# Patient Record
Sex: FEMALE | Race: WHITE | Employment: PART TIME | URBAN - METROPOLITAN AREA
[De-identification: names, ages, dates, MRNs, and addresses within clinical notes are randomized per-mention and may not be internally consistent; named-entity substitution may affect disease eponyms.]

---

## 2018-10-03 ENCOUNTER — INITIAL CONSULT (OUTPATIENT)
Dept: ONCOLOGY | Age: 63
End: 2018-10-03
Payer: COMMERCIAL

## 2018-10-03 ENCOUNTER — TELEPHONE (OUTPATIENT)
Dept: ONCOLOGY | Age: 63
End: 2018-10-03

## 2018-10-03 VITALS
BODY MASS INDEX: 23.67 KG/M2 | HEIGHT: 67 IN | HEART RATE: 89 BPM | DIASTOLIC BLOOD PRESSURE: 95 MMHG | SYSTOLIC BLOOD PRESSURE: 144 MMHG | TEMPERATURE: 99.1 F | WEIGHT: 150.8 LBS

## 2018-10-03 DIAGNOSIS — S20.112D: Primary | ICD-10-CM

## 2018-10-03 PROCEDURE — 99201 HC NEW PT, E/M LEVEL 1: CPT | Performed by: INTERNAL MEDICINE

## 2018-10-03 PROCEDURE — 99215 OFFICE O/P EST HI 40 MIN: CPT | Performed by: INTERNAL MEDICINE

## 2018-10-03 RX ORDER — HYDROXYZINE HYDROCHLORIDE 25 MG/1
TABLET, FILM COATED ORAL
COMMUNITY

## 2018-10-03 RX ORDER — ACETAMINOPHEN 325 MG/1
TABLET ORAL
COMMUNITY

## 2018-10-03 RX ORDER — LISINOPRIL 10 MG/1
TABLET ORAL
COMMUNITY
End: 2022-09-23

## 2018-10-03 RX ORDER — ZOLPIDEM TARTRATE 10 MG/1
TABLET ORAL
COMMUNITY

## 2018-10-03 RX ORDER — AMITRIPTYLINE HYDROCHLORIDE 25 MG/1
TABLET, FILM COATED ORAL
COMMUNITY
End: 2022-09-23

## 2018-10-03 RX ORDER — PROMETHAZINE HYDROCHLORIDE 25 MG/1
TABLET ORAL
COMMUNITY
End: 2022-09-23

## 2018-10-03 RX ORDER — GLUCOSAMINE/CHONDR SU A SOD 750-600 MG
TABLET ORAL
COMMUNITY

## 2018-10-03 RX ORDER — TAMOXIFEN CITRATE 10 MG/1
TABLET ORAL
COMMUNITY
Start: 2018-07-26 | End: 2019-01-09 | Stop reason: ALTCHOICE

## 2018-10-03 RX ORDER — TRAMADOL HYDROCHLORIDE 50 MG/1
TABLET ORAL
COMMUNITY

## 2018-10-03 RX ORDER — ALPRAZOLAM 0.25 MG/1
TABLET ORAL
COMMUNITY
Start: 2018-07-26

## 2018-10-03 NOTE — PROGRESS NOTES
approximate 12:00 area of the left breast.  This was found by mammography. She underwent a biopsy which by description was positive for invasive ductal carcinoma ER/OK positive HER-2 nonamplified. She subtotally underwent a left lumpectomy in January 2011. Pathology by description identified a 2 mm tumor that represented a T1 attila, PA and 0 with 4 sentinel lymph nodes identified M0 tumor. 90% ER positive. She underwent radiation therapy followed by tamoxifen. He is been on tamoxifen now for proximally 7 years. Describes frequent hot flashes 3-5 times a day often waking her up at night although she attributes her sleep status also to her chronic insomnia. Notes myalgias and arthralgias diffusely risks hands hips knees as a part of either arthritis \"or maybe the tamoxifen causing this to I question that. .. \". She was menopausal till age 61. He describes known conversations with Dr. Carmon Cranker as well as Dr. Boone Martinez in regard to known risks of tamoxifen this ethically she notes \"there was stroke, blood clot, as well the chance that I could get uterine cancer. Almer Forte Almer Forte Almer Forte \"  She describes conversations in regard to switching the tamoxifen to an aromatase inhibitor but that the choice was made to continue with the tamoxifen. She's not had a bone densitometry. Due to report high breast density she is been alternating every 6 months with mammography and breast MRI. He describes a recent conversation with Dr. Carmon Cranker in regard to [facility]" of the MRI contrast I presume that gadolinium and concerns about stopping the MRI which she describes that she would like to continue. She notes that she's been feeling well as of recent no fevers no chills or night sweats no new lumps bumps or ongoing bone pain are described. Energy appetite since well-being are excellent. She does note that she undergoes urine biopsies every 2 years.   She's not been seen by a medical oncologist in the past does cite being seen by Dr. Chantell Mehta

## 2018-10-03 NOTE — TELEPHONE ENCOUNTER
AVS from 10/3/2018     put orders in for labs in nov and dec 2018. He states he only wants the dec set of labs.    RV jan 9, 2019 @ 1pm    Pt was given AVS and appt schedule

## 2018-12-19 ENCOUNTER — HOSPITAL ENCOUNTER (OUTPATIENT)
Age: 63
Discharge: HOME | End: 2018-12-19
Payer: COMMERCIAL

## 2018-12-19 DIAGNOSIS — S20.112D: ICD-10-CM

## 2018-12-19 LAB
ABSOLUTE EOS #: 0.2 K/UL (ref 0–0.4)
ABSOLUTE IMMATURE GRANULOCYTE: ABNORMAL K/UL (ref 0–0.3)
ABSOLUTE LYMPH #: 2.2 K/UL (ref 1–4.8)
ABSOLUTE MONO #: 0.5 K/UL (ref 0.1–1.2)
ALBUMIN SERPL-MCNC: 4.1 G/DL (ref 3.5–5.2)
ALBUMIN/GLOBULIN RATIO: 1.5 (ref 1–2.5)
ALP BLD-CCNC: 81 U/L (ref 35–104)
ALT SERPL-CCNC: 22 U/L (ref 5–33)
ANION GAP SERPL CALCULATED.3IONS-SCNC: 16 MMOL/L (ref 9–17)
AST SERPL-CCNC: 28 U/L
BASOPHILS # BLD: 2 % (ref 0–2)
BASOPHILS ABSOLUTE: 0.1 K/UL (ref 0–0.2)
BILIRUB SERPL-MCNC: 0.32 MG/DL (ref 0.3–1.2)
BUN BLDV-MCNC: 14 MG/DL (ref 8–23)
BUN/CREAT BLD: ABNORMAL (ref 9–20)
CALCIUM SERPL-MCNC: 9.3 MG/DL (ref 8.6–10.4)
CHLORIDE BLD-SCNC: 104 MMOL/L (ref 98–107)
CO2: 23 MMOL/L (ref 20–31)
CREAT SERPL-MCNC: 0.61 MG/DL (ref 0.5–0.9)
DIFFERENTIAL TYPE: ABNORMAL
EOSINOPHILS RELATIVE PERCENT: 4 % (ref 1–4)
GFR AFRICAN AMERICAN: >60 ML/MIN
GFR NON-AFRICAN AMERICAN: >60 ML/MIN
GFR SERPL CREATININE-BSD FRML MDRD: ABNORMAL ML/MIN/{1.73_M2}
GFR SERPL CREATININE-BSD FRML MDRD: ABNORMAL ML/MIN/{1.73_M2}
GLUCOSE BLD-MCNC: 104 MG/DL (ref 70–99)
HCT VFR BLD CALC: 40.7 % (ref 36–46)
HEMOGLOBIN: 14.1 G/DL (ref 12–16)
IMMATURE GRANULOCYTES: ABNORMAL %
LYMPHOCYTES # BLD: 32 % (ref 24–44)
MCH RBC QN AUTO: 33.3 PG (ref 26–34)
MCHC RBC AUTO-ENTMCNC: 34.5 G/DL (ref 31–37)
MCV RBC AUTO: 96.3 FL (ref 80–100)
MONOCYTES # BLD: 7 % (ref 2–11)
NRBC AUTOMATED: ABNORMAL PER 100 WBC
PDW BLD-RTO: 12.3 % (ref 12.5–15.4)
PLATELET # BLD: 300 K/UL (ref 140–450)
PLATELET ESTIMATE: ABNORMAL
PMV BLD AUTO: 7.5 FL (ref 6–12)
POTASSIUM SERPL-SCNC: 4.2 MMOL/L (ref 3.7–5.3)
RBC # BLD: 4.23 M/UL (ref 4–5.2)
RBC # BLD: ABNORMAL 10*6/UL
SEG NEUTROPHILS: 55 % (ref 36–66)
SEGMENTED NEUTROPHILS ABSOLUTE COUNT: 3.7 K/UL (ref 1.8–7.7)
SODIUM BLD-SCNC: 143 MMOL/L (ref 135–144)
TOTAL PROTEIN: 6.9 G/DL (ref 6.4–8.3)
WBC # BLD: 6.7 K/UL (ref 3.5–11)
WBC # BLD: ABNORMAL 10*3/UL

## 2018-12-19 PROCEDURE — 36415 COLL VENOUS BLD VENIPUNCTURE: CPT

## 2018-12-19 PROCEDURE — 85025 COMPLETE CBC W/AUTO DIFF WBC: CPT

## 2018-12-19 PROCEDURE — 80053 COMPREHEN METABOLIC PANEL: CPT

## 2019-01-09 ENCOUNTER — OFFICE VISIT (OUTPATIENT)
Dept: ONCOLOGY | Age: 64
End: 2019-01-09
Payer: COMMERCIAL

## 2019-01-09 ENCOUNTER — TELEPHONE (OUTPATIENT)
Dept: ONCOLOGY | Age: 64
End: 2019-01-09

## 2019-01-09 VITALS
BODY MASS INDEX: 23.72 KG/M2 | SYSTOLIC BLOOD PRESSURE: 146 MMHG | TEMPERATURE: 98.2 F | DIASTOLIC BLOOD PRESSURE: 87 MMHG | HEART RATE: 79 BPM | WEIGHT: 151.44 LBS

## 2019-01-09 DIAGNOSIS — C50.412 MALIGNANT NEOPLASM OF UPPER-OUTER QUADRANT OF LEFT BREAST IN FEMALE, ESTROGEN RECEPTOR POSITIVE (HCC): Primary | ICD-10-CM

## 2019-01-09 DIAGNOSIS — Z17.0 MALIGNANT NEOPLASM OF UPPER-OUTER QUADRANT OF LEFT BREAST IN FEMALE, ESTROGEN RECEPTOR POSITIVE (HCC): Primary | ICD-10-CM

## 2019-01-09 PROCEDURE — 99213 OFFICE O/P EST LOW 20 MIN: CPT | Performed by: INTERNAL MEDICINE

## 2019-01-10 DIAGNOSIS — C50.412 MALIGNANT NEOPLASM OF UPPER-OUTER QUADRANT OF LEFT BREAST IN FEMALE, ESTROGEN RECEPTOR POSITIVE (HCC): Primary | ICD-10-CM

## 2019-01-10 DIAGNOSIS — Z17.0 MALIGNANT NEOPLASM OF UPPER-OUTER QUADRANT OF LEFT BREAST IN FEMALE, ESTROGEN RECEPTOR POSITIVE (HCC): Primary | ICD-10-CM

## 2019-06-19 ENCOUNTER — OFFICE VISIT (OUTPATIENT)
Dept: ONCOLOGY | Age: 64
End: 2019-06-19
Payer: COMMERCIAL

## 2019-06-19 ENCOUNTER — TELEPHONE (OUTPATIENT)
Dept: ONCOLOGY | Age: 64
End: 2019-06-19

## 2019-06-19 ENCOUNTER — HOSPITAL ENCOUNTER (OUTPATIENT)
Age: 64
Discharge: HOME | End: 2019-06-19
Payer: COMMERCIAL

## 2019-06-19 VITALS
DIASTOLIC BLOOD PRESSURE: 91 MMHG | BODY MASS INDEX: 23.46 KG/M2 | HEART RATE: 72 BPM | WEIGHT: 149.8 LBS | TEMPERATURE: 98.5 F | SYSTOLIC BLOOD PRESSURE: 155 MMHG

## 2019-06-19 DIAGNOSIS — C50.919 MALIGNANT NEOPLASM OF FEMALE BREAST, UNSPECIFIED ESTROGEN RECEPTOR STATUS, UNSPECIFIED LATERALITY, UNSPECIFIED SITE OF BREAST (HCC): ICD-10-CM

## 2019-06-19 PROCEDURE — 99213 OFFICE O/P EST LOW 20 MIN: CPT | Performed by: INTERNAL MEDICINE

## 2019-06-19 PROCEDURE — 99211 OFF/OP EST MAY X REQ PHY/QHP: CPT | Performed by: INTERNAL MEDICINE

## 2019-06-19 NOTE — TELEPHONE ENCOUNTER
Per Dr Andres Kraft AVS-pt to return to see him in June 2020-mamm to be done before-order given to pt per her request, she will get done at [facility] breast [facility]. Pt informed that [facility] will call her for rv in June 2020-pt understood.   Closer to return date, need to check w/Dr Andres Kraft to see if pt needs MRI (per pt)

## 2019-06-19 NOTE — PROGRESS NOTES
PROGRESS NOTE    PCP: Christiano Cantor MD  Referring Provider: No ref. provider found    VISIT DIAGNOSIS:  The encounter diagnosis was Malignant neoplasm of female breast, unspecified estrogen receptor status, unspecified laterality, unspecified site of breast ([facility] [address] PAST MEDICAL HISTORY:      Diagnosis Date    Anxiety     Cancer [facility])     left breast    Hypertension     Osteoarthritis        PAST SURGICAL HISTORY:      Procedure Laterality Date    BREAST LUMPECTOMY Left 2011    CYSTOSCOPY  2013    TONSILLECTOMY  1959       CURRENT MEDICATIONS:   Current Outpatient Medications   Medication Sig Dispense Refill    Probiotic Product (PROBIOTIC-10 PO) Take by mouth      metoprolol tartrate (LOPRESSOR) 25 MG tablet Take 25 mg by mouth 2 times daily       amitriptyline (ELAVIL) 25 MG tablet amitriptyline 25 mg tablet      promethazine (PHENERGAN) 25 MG tablet promethazine 25 mg tablet/ nightly      ALPRAZolam (XANAX) 0.25 MG tablet       traMADol (ULTRAM) 50 MG tablet / prn      zolpidem (AMBIEN) 10 MG tablet zolpidem 10 mg tablet      hydrOXYzine (ATARAX) 25 MG tablet hydroxyzine HCl 25 mg tablet/ using PRN      lisinopril (PRINIVIL;ZESTRIL) 10 MG tablet lisinopril 10 mg tablet   Take 1 tablet every day by oral route.  Lutein-Zeaxanthin 25-5 MG CAPS lutein 20 mg capsule      acetaminophen (TYLENOL) 325 MG tablet Tylenol   1 po qd PRN      Cholecalciferol (VITAMIN D3) 400 UNIT/ML LIQD Take by mouth       No current facility-administered medications for this visit. 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 female who is evaluation follow-up with a history of breast cancer. In interim since last seen she did sprain her ankle several weeks ago. She did see a family member who is an orthopedic surgeon and they did not feel she needed any intervention. It slowly gotten better it .   She denies other new lump on his bone pain no fevers no chills no night with radiation and some contraction of the breast.  There is no masses identified in the bilateral breasts. No new skin changes are noted. She had an myself of note. Will discharge either nipple bilateral axilla are without masses. There is no new changes. No pathologic changes. Lymphadenopathy:     She has no cervical adenopathy. Neurological: She is alert and oriented to person, place, and time. She displays normal reflexes. No cranial nerve deficit or sensory deficit. Coordination normal.   Skin: Skin is warm and dry. No rash noted. She is not diaphoretic. No erythema. No pallor.        LABS:   Results for orders placed or performed during the hospital encounter of 12/19/18   CBC With Auto Differential   Result Value Ref Range    WBC 6.7 3.5 - 11.0 k/uL    RBC 4.23 4.0 - 5.2 m/uL    Hemoglobin 14.1 12.0 - 16.0 g/dL    Hematocrit 40.7 36 - 46 %    MCV 96.3 80 - 100 fL    MCH 33.3 26 - 34 pg    MCHC 34.5 31 - 37 g/dL    RDW 12.3 (L) 12.5 - 15.4 %    Platelets 120 973 - 657 k/uL    MPV 7.5 6.0 - 12.0 fL    NRBC Automated NOT REPORTED per 100 WBC    Differential Type NOT REPORTED     Seg Neutrophils 55 36 - 66 %    Lymphocytes 32 24 - 44 %    Monocytes 7 2 - 11 %    Eosinophils % 4 1 - 4 %    Basophils 2 0 - 2 %    Immature Granulocytes NOT REPORTED 0 %    Segs Absolute 3.70 1.8 - 7.7 k/uL    Absolute Lymph # 2.20 1.0 - 4.8 k/uL    Absolute Mono # 0.50 0.1 - 1.2 k/uL    Absolute Eos # 0.20 0.0 - 0.4 k/uL    Basophils # 0.10 0.0 - 0.2 k/uL    Absolute Immature Granulocyte NOT REPORTED 0.00 - 0.30 k/uL    WBC Morphology NOT REPORTED     RBC Morphology NOT REPORTED     Platelet Estimate NOT REPORTED    Comprehensive Metabolic Panel   Result Value Ref Range    Glucose 104 (H) 70 - 99 mg/dL    BUN 14 8 - 23 mg/dL    CREATININE 0.61 0.50 - 0.90 mg/dL    Bun/Cre Ratio NOT REPORTED 9 - 20    Calcium 9.3 8.6 - 10.4 mg/dL    Sodium 143 135 - 144 mmol/L    Potassium 4.2 3.7 - 5.3 mmol/L    Chloride 104 98 - 107 mmol/L CO2 23 20 - 31 mmol/L    Anion Gap 16 9 - 17 mmol/L    Alkaline Phosphatase 81 35 - 104 U/L    ALT 22 5 - 33 U/L    AST 28 <32 U/L    Total Bilirubin 0.32 0.3 - 1.2 mg/dL    Total Protein 6.9 6.4 - 8.3 g/dL    Alb 4.1 3.5 - 5.2 g/dL    Albumin/Globulin Ratio 1.5 1.0 - 2.5    GFR Non-African American >60 >60 mL/min    GFR African American >60 >60 mL/min    GFR Comment          GFR Staging NOT REPORTED        IMPRESSION:     1. Malignant neoplasm of female breast, unspecified estrogen receptor status, unspecified laterality, unspecified site of breast ([facility] [address]        There is no problem list on file for this patient. PLAN:     1. This is 69-year-old white female with known limited medical problems include chronic insomnia, interstitial cystitis, question of PCO S, anxiety  She is otherwise healthy. In 2011 she under went a mammogram revealing abnormality. Biopsy resulting invasive ductal carcinoma. Subsequently underwent a left lumpectomy. .  Tumor was ER positive HER-2 nonamplified 2 mm tumor. Stage I pT1 attila, PNI oh M0. .  She underwent adjuvant radiation therapy by subsequent tamoxifen given that she was premenopausal.  Prescription and she was menopausal at age 61. Not any periods since. Had an extensive discussion of the last visit  Of continuing tamoxifen up to the 10 year and it of study as versus switching her  To aromatase therapy to complete 10 years as well versus stopping. Did to stop and hormonal therapy with previous oncologist.  I did rediscuss benefits and consideration despite a lengthy hiatus and ultimately she is adamantly against it as she relates again and given that she is now 8 to 9 years out the benefits of N-terminal therapy would be I think unknown/marginal adamantly against as well. More of the breast is performed did not reveal any concerning/pathologic findings. We ordered mammogram for 6 months and repeat MRI as well as CBC CMP prior to 1 year follow-up.   He is encouraged to contact me in the interim new questions problems or ongoing issues. She is in agreement with the plan of care.       2.       Electronically signed by Pema Thomas DO on 6/19/2019 at 1:57 PM

## 2020-06-01 ENCOUNTER — HOSPITAL ENCOUNTER (OUTPATIENT)
Age: 65
Discharge: HOME | End: 2020-06-01
Payer: COMMERCIAL

## 2020-06-01 LAB
ABSOLUTE EOS #: 0.17 K/UL (ref 0–0.44)
ABSOLUTE EOS #: 0.17 K/UL (ref 0–0.44)
ABSOLUTE IMMATURE GRANULOCYTE: <0.03 K/UL (ref 0–0.3)
ABSOLUTE IMMATURE GRANULOCYTE: <0.03 K/UL (ref 0–0.3)
ABSOLUTE LYMPH #: 2.15 K/UL (ref 1.1–3.7)
ABSOLUTE LYMPH #: 2.15 K/UL (ref 1.1–3.7)
ABSOLUTE MONO #: 0.46 K/UL (ref 0.1–1.2)
ABSOLUTE MONO #: 0.46 K/UL (ref 0.1–1.2)
ALBUMIN SERPL-MCNC: 4 G/DL (ref 3.5–5.2)
ALBUMIN SERPL-MCNC: 4 G/DL (ref 3.5–5.2)
ALBUMIN/GLOBULIN RATIO: 1.4 (ref 1–2.5)
ALBUMIN/GLOBULIN RATIO: 1.4 (ref 1–2.5)
ALP BLD-CCNC: 74 U/L (ref 35–104)
ALP BLD-CCNC: 74 U/L (ref 35–104)
ALT SERPL-CCNC: 13 U/L (ref 5–33)
ALT SERPL-CCNC: 13 U/L (ref 5–33)
ANION GAP SERPL CALCULATED.3IONS-SCNC: 11 MMOL/L (ref 9–17)
ANION GAP SERPL CALCULATED.3IONS-SCNC: 11 MMOL/L (ref 9–17)
AST SERPL-CCNC: 19 U/L
AST SERPL-CCNC: 19 U/L
BASOPHILS # BLD: 1 % (ref 0–2)
BASOPHILS # BLD: 1 % (ref 0–2)
BASOPHILS ABSOLUTE: 0.07 K/UL (ref 0–0.2)
BASOPHILS ABSOLUTE: 0.07 K/UL (ref 0–0.2)
BILIRUB SERPL-MCNC: 0.4 MG/DL (ref 0.3–1.2)
BILIRUB SERPL-MCNC: 0.4 MG/DL (ref 0.3–1.2)
BUN BLDV-MCNC: 14 MG/DL (ref 8–23)
BUN BLDV-MCNC: 14 MG/DL (ref 8–23)
BUN/CREAT BLD: NORMAL (ref 9–20)
BUN/CREAT BLD: NORMAL (ref 9–20)
CALCIUM SERPL-MCNC: 9.3 MG/DL (ref 8.6–10.4)
CALCIUM SERPL-MCNC: 9.3 MG/DL (ref 8.6–10.4)
CHLORIDE BLD-SCNC: 100 MMOL/L (ref 98–107)
CHLORIDE BLD-SCNC: 100 MMOL/L (ref 98–107)
CHOLESTEROL/HDL RATIO: 3.9
CHOLESTEROL: 228 MG/DL
CO2: 24 MMOL/L (ref 20–31)
CO2: 24 MMOL/L (ref 20–31)
CREAT SERPL-MCNC: 0.62 MG/DL (ref 0.5–0.9)
CREAT SERPL-MCNC: 0.62 MG/DL (ref 0.5–0.9)
DIFFERENTIAL TYPE: NORMAL
DIFFERENTIAL TYPE: NORMAL
EOSINOPHILS RELATIVE PERCENT: 3 % (ref 1–4)
EOSINOPHILS RELATIVE PERCENT: 3 % (ref 1–4)
GFR AFRICAN AMERICAN: >60 ML/MIN
GFR AFRICAN AMERICAN: >60 ML/MIN
GFR NON-AFRICAN AMERICAN: >60 ML/MIN
GFR NON-AFRICAN AMERICAN: >60 ML/MIN
GFR SERPL CREATININE-BSD FRML MDRD: NORMAL ML/MIN/{1.73_M2}
GLUCOSE BLD-MCNC: 81 MG/DL (ref 70–99)
GLUCOSE BLD-MCNC: 81 MG/DL (ref 70–99)
HCT VFR BLD CALC: 44.4 % (ref 36.3–47.1)
HCT VFR BLD CALC: 44.4 % (ref 36.3–47.1)
HDLC SERPL-MCNC: 58 MG/DL
HEMOGLOBIN: 14.4 G/DL (ref 11.9–15.1)
HEMOGLOBIN: 14.4 G/DL (ref 11.9–15.1)
IMMATURE GRANULOCYTES: 0 %
IMMATURE GRANULOCYTES: 0 %
LDL CHOLESTEROL: 145 MG/DL (ref 0–130)
LYMPHOCYTES # BLD: 40 % (ref 24–43)
LYMPHOCYTES # BLD: 40 % (ref 24–43)
MCH RBC QN AUTO: 32.6 PG (ref 25.2–33.5)
MCH RBC QN AUTO: 32.6 PG (ref 25.2–33.5)
MCHC RBC AUTO-ENTMCNC: 32.4 G/DL (ref 28.4–34.8)
MCHC RBC AUTO-ENTMCNC: 32.4 G/DL (ref 28.4–34.8)
MCV RBC AUTO: 100.5 FL (ref 82.6–102.9)
MCV RBC AUTO: 100.5 FL (ref 82.6–102.9)
MONOCYTES # BLD: 9 % (ref 3–12)
MONOCYTES # BLD: 9 % (ref 3–12)
NRBC AUTOMATED: 0 PER 100 WBC
NRBC AUTOMATED: 0 PER 100 WBC
PDW BLD-RTO: 11.9 % (ref 11.8–14.4)
PDW BLD-RTO: 11.9 % (ref 11.8–14.4)
PLATELET # BLD: 249 K/UL (ref 138–453)
PLATELET # BLD: 249 K/UL (ref 138–453)
PLATELET ESTIMATE: NORMAL
PLATELET ESTIMATE: NORMAL
PMV BLD AUTO: 9.9 FL (ref 8.1–13.5)
PMV BLD AUTO: 9.9 FL (ref 8.1–13.5)
POTASSIUM SERPL-SCNC: 4.4 MMOL/L (ref 3.7–5.3)
POTASSIUM SERPL-SCNC: 4.4 MMOL/L (ref 3.7–5.3)
RBC # BLD: 4.42 M/UL (ref 3.95–5.11)
RBC # BLD: 4.42 M/UL (ref 3.95–5.11)
RBC # BLD: NORMAL 10*6/UL
RBC # BLD: NORMAL 10*6/UL
SEG NEUTROPHILS: 47 % (ref 36–65)
SEG NEUTROPHILS: 47 % (ref 36–65)
SEGMENTED NEUTROPHILS ABSOLUTE COUNT: 2.48 K/UL (ref 1.5–8.1)
SEGMENTED NEUTROPHILS ABSOLUTE COUNT: 2.48 K/UL (ref 1.5–8.1)
SODIUM BLD-SCNC: 135 MMOL/L (ref 135–144)
SODIUM BLD-SCNC: 135 MMOL/L (ref 135–144)
TOTAL PROTEIN: 6.9 G/DL (ref 6.4–8.3)
TOTAL PROTEIN: 6.9 G/DL (ref 6.4–8.3)
TRIGL SERPL-MCNC: 125 MG/DL
VLDLC SERPL CALC-MCNC: ABNORMAL MG/DL (ref 1–30)
WBC # BLD: 5.3 K/UL (ref 3.5–11.3)
WBC # BLD: 5.3 K/UL (ref 3.5–11.3)
WBC # BLD: NORMAL 10*3/UL
WBC # BLD: NORMAL 10*3/UL

## 2020-06-01 PROCEDURE — 85025 COMPLETE CBC W/AUTO DIFF WBC: CPT

## 2020-06-01 PROCEDURE — 80053 COMPREHEN METABOLIC PANEL: CPT

## 2020-06-01 PROCEDURE — 80061 LIPID PANEL: CPT

## 2022-04-03 ENCOUNTER — HOSPITAL ENCOUNTER (EMERGENCY)
Age: 67
Discharge: HOME | End: 2022-04-04
Attending: EMERGENCY MEDICINE
Payer: MEDICARE

## 2022-04-03 DIAGNOSIS — I10 ESSENTIAL HYPERTENSION: ICD-10-CM

## 2022-04-03 DIAGNOSIS — R51.9 ACUTE NONINTRACTABLE HEADACHE, UNSPECIFIED HEADACHE TYPE: Primary | ICD-10-CM

## 2022-04-03 LAB
ABSOLUTE EOS #: 0 K/UL (ref 0–0.4)
ABSOLUTE LYMPH #: 2.3 K/UL (ref 1–4.8)
ABSOLUTE MONO #: 1 K/UL (ref 0.1–1.2)
ALBUMIN SERPL-MCNC: 4.4 G/DL (ref 3.5–5.2)
ALBUMIN/GLOBULIN RATIO: 1.6 (ref 1–2.5)
ALP BLD-CCNC: 73 U/L (ref 35–104)
ALT SERPL-CCNC: 30 U/L (ref 5–33)
ANION GAP SERPL CALCULATED.3IONS-SCNC: 13 MMOL/L (ref 9–17)
AST SERPL-CCNC: 23 U/L
BASOPHILS # BLD: 0 % (ref 0–2)
BASOPHILS ABSOLUTE: 0 K/UL (ref 0–0.2)
BILIRUB SERPL-MCNC: 0.34 MG/DL (ref 0.3–1.2)
BUN BLDV-MCNC: 15 MG/DL (ref 8–23)
CALCIUM SERPL-MCNC: 9.4 MG/DL (ref 8.6–10.4)
CHLORIDE BLD-SCNC: 93 MMOL/L (ref 98–107)
CO2: 25 MMOL/L (ref 20–31)
CREAT SERPL-MCNC: 0.48 MG/DL (ref 0.5–0.9)
EOSINOPHILS RELATIVE PERCENT: 0 % (ref 1–4)
GFR AFRICAN AMERICAN: >60 ML/MIN
GFR NON-AFRICAN AMERICAN: >60 ML/MIN
GFR SERPL CREATININE-BSD FRML MDRD: ABNORMAL ML/MIN/{1.73_M2}
GLUCOSE BLD-MCNC: 97 MG/DL (ref 70–99)
HCT VFR BLD CALC: 42.4 % (ref 36–46)
HEMOGLOBIN: 14.6 G/DL (ref 12–16)
LIPASE: 91 U/L (ref 13–60)
LYMPHOCYTES # BLD: 24 % (ref 24–44)
MCH RBC QN AUTO: 32.5 PG (ref 26–34)
MCHC RBC AUTO-ENTMCNC: 34.3 G/DL (ref 31–37)
MCV RBC AUTO: 94.8 FL (ref 80–100)
MONOCYTES # BLD: 10 % (ref 2–11)
PDW BLD-RTO: 12.4 % (ref 12.5–15.4)
PLATELET # BLD: 330 K/UL (ref 140–450)
PMV BLD AUTO: 7.7 FL (ref 6–12)
POTASSIUM SERPL-SCNC: 4.1 MMOL/L (ref 3.7–5.3)
RBC # BLD: 4.48 M/UL (ref 4–5.2)
SEG NEUTROPHILS: 66 % (ref 36–66)
SEGMENTED NEUTROPHILS ABSOLUTE COUNT: 6.5 K/UL (ref 1.8–7.7)
SODIUM BLD-SCNC: 131 MMOL/L (ref 135–144)
TOTAL PROTEIN: 7.1 G/DL (ref 6.4–8.3)
WBC # BLD: 10 K/UL (ref 3.5–11)

## 2022-04-03 PROCEDURE — 80053 COMPREHEN METABOLIC PANEL: CPT

## 2022-04-03 PROCEDURE — 96375 TX/PRO/DX INJ NEW DRUG ADDON: CPT

## 2022-04-03 PROCEDURE — 6360000002 HC RX W HCPCS: Performed by: EMERGENCY MEDICINE

## 2022-04-03 PROCEDURE — 6370000000 HC RX 637 (ALT 250 FOR IP): Performed by: EMERGENCY MEDICINE

## 2022-04-03 PROCEDURE — 2580000003 HC RX 258: Performed by: NURSE PRACTITIONER

## 2022-04-03 PROCEDURE — 36415 COLL VENOUS BLD VENIPUNCTURE: CPT

## 2022-04-03 PROCEDURE — 83690 ASSAY OF LIPASE: CPT

## 2022-04-03 PROCEDURE — 99285 EMERGENCY DEPT VISIT HI MDM: CPT

## 2022-04-03 PROCEDURE — 85025 COMPLETE CBC W/AUTO DIFF WBC: CPT

## 2022-04-03 PROCEDURE — 96361 HYDRATE IV INFUSION ADD-ON: CPT

## 2022-04-03 PROCEDURE — 6370000000 HC RX 637 (ALT 250 FOR IP): Performed by: NURSE PRACTITIONER

## 2022-04-03 PROCEDURE — 96372 THER/PROPH/DIAG INJ SC/IM: CPT

## 2022-04-03 PROCEDURE — 6360000002 HC RX W HCPCS: Performed by: NURSE PRACTITIONER

## 2022-04-03 PROCEDURE — 96365 THER/PROPH/DIAG IV INF INIT: CPT

## 2022-04-03 RX ORDER — MAGNESIUM SULFATE 1 G/100ML
1000 INJECTION INTRAVENOUS ONCE
Status: COMPLETED | OUTPATIENT
Start: 2022-04-03 | End: 2022-04-03

## 2022-04-03 RX ORDER — NALBUPHINE HCL 10 MG/ML
10 AMPUL (ML) INJECTION ONCE
Status: DISCONTINUED | OUTPATIENT
Start: 2022-04-03 | End: 2022-04-03

## 2022-04-03 RX ORDER — DEXAMETHASONE SODIUM PHOSPHATE 10 MG/ML
10 INJECTION INTRAMUSCULAR; INTRAVENOUS ONCE
Status: COMPLETED | OUTPATIENT
Start: 2022-04-03 | End: 2022-04-03

## 2022-04-03 RX ORDER — ACETAMINOPHEN 500 MG
1000 TABLET ORAL ONCE
Status: COMPLETED | OUTPATIENT
Start: 2022-04-03 | End: 2022-04-03

## 2022-04-03 RX ORDER — SUMATRIPTAN 6 MG/.5ML
6 INJECTION, SOLUTION SUBCUTANEOUS ONCE
Status: COMPLETED | OUTPATIENT
Start: 2022-04-03 | End: 2022-04-03

## 2022-04-03 RX ORDER — ORPHENADRINE CITRATE 30 MG/ML
60 INJECTION INTRAMUSCULAR; INTRAVENOUS ONCE
Status: COMPLETED | OUTPATIENT
Start: 2022-04-03 | End: 2022-04-03

## 2022-04-03 RX ORDER — DIPHENHYDRAMINE HYDROCHLORIDE 50 MG/ML
25 INJECTION INTRAMUSCULAR; INTRAVENOUS ONCE
Status: COMPLETED | OUTPATIENT
Start: 2022-04-03 | End: 2022-04-03

## 2022-04-03 RX ORDER — ONDANSETRON 2 MG/ML
4 INJECTION INTRAMUSCULAR; INTRAVENOUS ONCE
Status: COMPLETED | OUTPATIENT
Start: 2022-04-03 | End: 2022-04-03

## 2022-04-03 RX ORDER — 0.9 % SODIUM CHLORIDE 0.9 %
1000 INTRAVENOUS SOLUTION INTRAVENOUS ONCE
Status: COMPLETED | OUTPATIENT
Start: 2022-04-03 | End: 2022-04-03

## 2022-04-03 RX ORDER — 0.9 % SODIUM CHLORIDE 0.9 %
1000 INTRAVENOUS SOLUTION INTRAVENOUS ONCE
Status: DISCONTINUED | OUTPATIENT
Start: 2022-04-03 | End: 2022-04-03

## 2022-04-03 RX ORDER — DROPERIDOL 2.5 MG/ML
2.5 INJECTION, SOLUTION INTRAMUSCULAR; INTRAVENOUS EVERY 6 HOURS PRN
Status: DISCONTINUED | OUTPATIENT
Start: 2022-04-03 | End: 2022-04-04 | Stop reason: HOSPADM

## 2022-04-03 RX ADMIN — MAGNESIUM SULFATE HEPTAHYDRATE 1000 MG: 1 INJECTION, SOLUTION INTRAVENOUS at 20:43

## 2022-04-03 RX ADMIN — ONDANSETRON 4 MG: 2 INJECTION INTRAMUSCULAR; INTRAVENOUS at 19:02

## 2022-04-03 RX ADMIN — SUMATRIPTAN SUCCINATE 6 MG: 6 INJECTION SUBCUTANEOUS at 20:41

## 2022-04-03 RX ADMIN — HYDROMORPHONE HYDROCHLORIDE 0.5 MG: 1 INJECTION, SOLUTION INTRAMUSCULAR; INTRAVENOUS; SUBCUTANEOUS at 23:54

## 2022-04-03 RX ADMIN — ORPHENADRINE CITRATE 60 MG: 30 INJECTION INTRAMUSCULAR; INTRAVENOUS at 20:42

## 2022-04-03 RX ADMIN — HYDROMORPHONE HYDROCHLORIDE 1 MG: 1 INJECTION, SOLUTION INTRAMUSCULAR; INTRAVENOUS; SUBCUTANEOUS at 23:25

## 2022-04-03 RX ADMIN — SODIUM CHLORIDE 1000 ML: 9 INJECTION, SOLUTION INTRAVENOUS at 19:02

## 2022-04-03 RX ADMIN — DIPHENHYDRAMINE HYDROCHLORIDE 25 MG: 50 INJECTION INTRAMUSCULAR; INTRAVENOUS at 19:08

## 2022-04-03 RX ADMIN — DEXAMETHASONE SODIUM PHOSPHATE 10 MG: 10 INJECTION INTRAMUSCULAR; INTRAVENOUS at 19:04

## 2022-04-03 RX ADMIN — DROPERIDOL 2.5 MG: 2.5 INJECTION, SOLUTION INTRAMUSCULAR; INTRAVENOUS at 22:20

## 2022-04-03 RX ADMIN — ACETAMINOPHEN 1000 MG: 500 TABLET ORAL at 19:09

## 2022-04-03 ASSESSMENT — PAIN DESCRIPTION - PROGRESSION
CLINICAL_PROGRESSION: NOT CHANGED
CLINICAL_PROGRESSION: NOT CHANGED

## 2022-04-03 ASSESSMENT — ENCOUNTER SYMPTOMS
CONSTIPATION: 0
RESPIRATORY NEGATIVE: 1
NAUSEA: 1
VOMITING: 0
ANAL BLEEDING: 0
EYES NEGATIVE: 1
ABDOMINAL PAIN: 0
RECTAL PAIN: 0
BLOOD IN STOOL: 0
ABDOMINAL DISTENTION: 0
DIARRHEA: 0

## 2022-04-03 ASSESSMENT — PAIN SCALES - GENERAL
PAINLEVEL_OUTOF10: 10
PAINLEVEL_OUTOF10: 10
PAINLEVEL_OUTOF10: 8
PAINLEVEL_OUTOF10: 10

## 2022-04-03 ASSESSMENT — PAIN - FUNCTIONAL ASSESSMENT: PAIN_FUNCTIONAL_ASSESSMENT: 0-10

## 2022-04-03 ASSESSMENT — PAIN DESCRIPTION - LOCATION: LOCATION: HEAD

## 2022-04-03 ASSESSMENT — PAIN DESCRIPTION - PAIN TYPE: TYPE: ACUTE PAIN

## 2022-04-03 ASSESSMENT — PAIN DESCRIPTION - ORIENTATION: ORIENTATION: UPPER

## 2022-04-03 NOTE — ED PROVIDER NOTES
[id number] [facility] ED  [address] [address] [facility] [address] [facility] [address]  Phone: [phone]  Fax: [phone]        ADDENDUM:      Care of this patient was assumed from Dr. Malathi Olea. The patient was seen for Hypertension and Headache (ongoing 1.5 weeks)  . The patient's initial evaluation and plan have been discussed with the prior provider who initially evaluated the patient. Nursing Notes, Past Medical Hx, Past Surgical Hx, Allergies, were all reviewed. PAST MEDICAL HISTORY    has a past medical history of Anxiety, Cancer ([facility] [address] Hypertension, and Osteoarthritis. SURGICAL HISTORY      has a past surgical history that includes Cystoscopy (2013); Tonsillectomy (1959); and Breast lumpectomy (Left, 2011). CURRENT MEDICATIONS       Discharge Medication List as of 4/3/2022 11:50 PM      CONTINUE these medications which have NOT CHANGED    Details   Probiotic Product (PROBIOTIC-10 PO) Take by mouthHistorical Med      metoprolol tartrate (LOPRESSOR) 25 MG tablet Take 25 mg by mouth 2 times daily Historical Med      amitriptyline (ELAVIL) 25 MG tablet amitriptyline 25 mg tabletHistorical Med      promethazine (PHENERGAN) 25 MG tablet promethazine 25 mg tablet/ nightlyHistorical Med      ALPRAZolam (XANAX) 0.25 MG tablet Historical Med      traMADol (ULTRAM) 50 MG tablet / prnHistorical Med      zolpidem (AMBIEN) 10 MG tablet zolpidem 10 mg tabletHistorical Med      hydrOXYzine (ATARAX) 25 MG tablet hydroxyzine HCl 25 mg tablet/ using PRNHistorical Med      lisinopril (PRINIVIL;ZESTRIL) 10 MG tablet lisinopril 10 mg tablet   Take 1 tablet every day by oral route. Historical Med      Lutein-Zeaxanthin 25-5 MG CAPS lutein 20 mg capsuleHistorical Med      acetaminophen (TYLENOL) 325 MG tablet Tylenol   1 po qd PRNHistorical Med      Cholecalciferol (VITAMIN D3) 400 UNIT/ML LIQD Take by mouthHistorical Med             ALLERGIES     is allergic to aspirin, ibuprofen, and naproxen.       Diagnostic Results LABS:   Results for orders placed or performed during the hospital encounter of 04/03/22   CBC with Auto Differential   Result Value Ref Range    WBC 10.0 3.5 - 11.0 k/uL    RBC 4.48 4.0 - 5.2 m/uL    Hemoglobin 14.6 12.0 - 16.0 g/dL    Hematocrit 42.4 36 - 46 %    MCV 94.8 80 - 100 fL    MCH 32.5 26 - 34 pg    MCHC 34.3 31 - 37 g/dL    RDW 12.4 (L) 12.5 - 15.4 %    Platelets 953 103 - 640 k/uL    MPV 7.7 6.0 - 12.0 fL    Seg Neutrophils 66 36 - 66 %    Lymphocytes 24 24 - 44 %    Monocytes 10 2 - 11 %    Eosinophils % 0 (L) 1 - 4 %    Basophils 0 0 - 2 %    Segs Absolute 6.50 1.8 - 7.7 k/uL    Absolute Lymph # 2.30 1.0 - 4.8 k/uL    Absolute Mono # 1.00 0.1 - 1.2 k/uL    Absolute Eos # 0.00 0.0 - 0.4 k/uL    Basophils Absolute 0.00 0.0 - 0.2 k/uL   Comprehensive Metabolic Panel w/ Reflex to MG   Result Value Ref Range    Glucose 97 70 - 99 mg/dL    BUN 15 8 - 23 mg/dL    CREATININE 0.48 (L) 0.50 - 0.90 mg/dL    Calcium 9.4 8.6 - 10.4 mg/dL    Sodium 131 (L) 135 - 144 mmol/L    Potassium 4.1 3.7 - 5.3 mmol/L    Chloride 93 (L) 98 - 107 mmol/L    CO2 25 20 - 31 mmol/L    Anion Gap 13 9 - 17 mmol/L    Alkaline Phosphatase 73 35 - 104 U/L    ALT 30 5 - 33 U/L    AST 23 <32 U/L    Total Bilirubin 0.34 0.3 - 1.2 mg/dL    Total Protein 7.1 6.4 - 8.3 g/dL    Albumin 4.4 3.5 - 5.2 g/dL    Albumin/Globulin Ratio 1.6 1.0 - 2.5    GFR Non-African American >60 >60 mL/min    GFR African American >60 >60 mL/min    GFR Comment         Lipase   Result Value Ref Range    Lipase 91 (H) 13 - 60 U/L       RADIOLOGY:  No orders to display       RECENT VITALS:  BP: (!) 186/96, Temp: 98.4 °F (36.9 °C), Pulse: 66, Resp: 15     ED Course     The patient was given the following medications:  Orders Placed This Encounter   Medications    0.9 % sodium chloride bolus    ondansetron (ZOFRAN) injection 4 mg    dexamethasone (DECADRON) injection 10 mg    acetaminophen (TYLENOL) tablet 1,000 mg    diphenhydrAMINE (BENADRYL) injection 25 mg  SUMAtriptan (IMITREX) injection 6 mg    magnesium sulfate 1000 mg in dextrose 5% 100 mL IVPB    orphenadrine (NORFLEX) injection 60 mg    droperidol (INAPSINE) injection 2.5 mg    HYDROmorphone (DILAUDID) injection 1 mg    HYDROmorphone (DILAUDID) injection 0.5 mg       Medical Decision Making           The patient is a 78-year-old female who presents for evaluation of a recurrent headache. The patient states that she was seen at [place] [address] twice last fall for a headache and was ultimately referred to neurology because she did not have improvement with any of the medications that she was treated with in the ER. She has also had issues with hypertension over the past year and is currently on losartan and carvedilol with better control of her blood pressure. The patient has had negative imaging of her head and negative EEG. She was told by neurology, Dr. Jeremy Betancur, that she may be having migraines but the patient does not think that these are migraines. She was told by her PCP that her headaches may be related to her blood pressure. The patient states that she has not had a headache for approximately 3 months but starting 2 weeks ago she developed a sinus infection with pain to her face and radiating into her teeth. She states that she has also developed associated headache to the top of her head with intermittent photophobia. She had a telemedicine visit with her PCP a few days ago and was started on cefdinir and prednisone. She has been taking his medications with some improvement in her sinus pressure but no improvement in her headache. She states that she did have a sinus infection before her last headache. She currently denies having any associated vision changes, photophobia, phonophobia, dizziness, lightheadedness, abdominal pain, nausea or vomiting. The patient states that she is allergic to NSAIDs and has issues with insomnia.   Today she noticed that her blood pressures were elevated at home but they have been labile. She has not yet taken her evening dose of medications. The patient is refusing a CT head today. CBC and CMP are unremarkable. Lipase is mildly elevated 91 but she does not have any abdominal tenderness on exam and denies any nausea or vomiting. At time of signout plan to reassess and see if she has had any improvement in her headache. I reevaluated the patient. Cranial nerves II through XII intact. No cerebellar signs. No pronator drift. Normal finger-to-nose. No focal sensorimotor deficit. She is able to ambulate with a steady gait. The patient reports that she had no improvement with IV fluids, Benadryl, Tylenol, Decadron and Zofran. We had a long discussion about treatment options and the patient eventually agreed to try some other medications to control her headache. She was treated with Norflex, magnesium and Imitrex without improvement. I ordered the [place] we are out of this. I subsequently gave her droperidol without relief. On repeat assessment she continues to be alert and oriented without any focal deficits. I offered to have her admitted for evaluation by neurology but the patient declined. She was subsequently treated with 1 mg Dilaudid with some improvement. She was given the option again to be admitted but declined. I offered to reimage her head but she declines. She was given a second dose of 0.5 mg Dilaudid with some improvement. Her blood pressures were labile in the ER and she took her home blood pressure medications with some improvement in her blood pressure. The patient states that she would like to be discharged home and follow-up with her neurologist outpatient. I suspect that her sinus infection is likely viral but she has already started the cefdinir and prednisone so I encouraged her to finish her course based on her PCPs recommendations. I suspect this is a trigger for her headaches and she may be having underlying migraines. She was instructed to follow-up with her neurologist tomorrow and to return to the ER for worsening symptoms or any other concern. The patient understands that at this time there is no evidence for a more malignant underlying process, but also understands that early in the process of an illness or injury, an emergency department workup can be falsely reassuring. Routine discharge counseling was given, and the patient understands that worsening, changing or persistent symptoms should prompt an immediate call or follow up with their primary physician or return to the emergency department. The importance of appropriate follow up was also discussed. I have reviewed the disposition diagnosis with the patient. I have answered their questions and given discharge instructions. They voiced understanding of these instructions and did not have any further questions or complaints. Disposition     FINAL IMPRESSION      1. Acute nonintractable headache, unspecified headache type    2. Essential hypertension          DISPOSITION/PLAN   DISPOSITION Decision To Discharge 04/03/2022 11:48:43 PM      CONDITION ON DISPOSITION:   Stable    PATIENT REFERRED TO:  Jacob Cooley MD  [address]  [phone]    Schedule an appointment as soon as possible for a visit in 1 day      Robert Jesus MD  [address]  [address] [place] [address] [place] [address] [phone]    Schedule an appointment as soon as possible for a visit in 2 days      [facility] ED  [address] [place] [address]  [phone]  Go to   If symptoms worsen      DISCHARGE MEDICATIONS:  Discharge Medication List as of 4/3/2022 11:50 PM                (Please note that portions of this note were completed with a voice recognition program.  Efforts were made to edit the dictations but occasionally words are mis-transcribed.)    Agnieszka Tafoya DO  Emergency Medicine Physician                 Agnieszka Tafoya, DO  04/04/22 0601

## 2022-04-03 NOTE — ED PROVIDER NOTES
I was present in the ED during this patient's evaluation and management by the Advance Practice Provider and was available to address any concerns about their medical management.     Paula Ballesteros MD  Attending, Emergency Department      Catherine Thornton MD  04/03/22 1259

## 2022-04-03 NOTE — ED PROVIDER NOTES
[address] [facility] ED  [address] [facility] [address]  Phone: [phone]  Fax: [phone]        Pt Name: Naty Brooks  MRN: [id number]  [place] 1955  Date of evaluation: 4/3/22    [address] [place] [address]       Chief Complaint   Patient presents with    Hypertension    Headache     ongoing 1.5 weeks       HISTORY OF PRESENT ILLNESS (Location/Symptom, Timing/Onset, Context/Setting, Quality, Duration, Modifying Factors, Severity)      Naty Brooks is a 77 y.o. female with no pertinent PMH who presents to the ED via private auto with complaints of a headache has been going on for about a week and a half. Patient was diagnosed with a sinus infection by her PCP via telemedicine on Wednesday of this last week and was started on cefdinir and prednisone. She denies any chest pain or shortness of breath. She denies any dizziness lightheadedness numbness or tingling in extremities gait changes facial asymmetry. She denies any abdominal pain or back pain. She denies any fever chills or body aches. She states has been taking her medications as prescribed. She does have history of hypertension, as well as has a history of headaches. She was following with a cardiologist as well and her neurologist, but no longer does. She denies any hematuria, hematemesis, hematochezia. She denies any dysuria or increased urinary frequency/urgency. She states that when she had a headache, she took her blood pressure at home and noticed that it was elevated, that is what brought her into the emergency department. PAST MEDICAL / SURGICAL / SOCIAL / FAMILY HISTORY     PMH:  has a past medical history of Anxiety, Cancer ([address] Hypertension, and Osteoarthritis. Surgical History:  has a past surgical history that includes Cystoscopy (2013); Tonsillectomy (1959); and Breast lumpectomy (Left, 2011). Social History:  reports that she has never smoked.  She has never used smokeless tobacco. She reports that she does not use drugs. Family History: She indicated that her mother is . She indicated that her father is . She indicated that the status of her brother is unknown.   family history includes Cancer in her father and mother; Heart Attack in her mother; Heart Disease in her brother; High Blood Pressure in her mother; High Cholesterol in her mother; Stroke in her mother. Psychiatric History: None    Allergies: Aspirin, Ibuprofen, and Naproxen    Home Medications:   Prior to Admission medications    Medication Sig Start Date End Date Taking? Authorizing Provider   Probiotic Product (PROBIOTIC-10 PO) Take by mouth    Historical Provider, MD   metoprolol tartrate (LOPRESSOR) 25 MG tablet Take 25 mg by mouth 2 times daily  18   Historical Provider, MD   amitriptyline (ELAVIL) 25 MG tablet amitriptyline 25 mg tablet    Historical Provider, MD   promethazine (PHENERGAN) 25 MG tablet promethazine 25 mg tablet/ nightly    Historical Provider, MD   ALPRAZolam Melida Heys) 0.25 MG tablet  18   Historical Provider, MD   traMADol (ULTRAM) 50 MG tablet / prn    Historical Provider, MD   zolpidem (AMBIEN) 10 MG tablet zolpidem 10 mg tablet    Historical Provider, MD   hydrOXYzine (ATARAX) 25 MG tablet hydroxyzine HCl 25 mg tablet/ using PRN    Historical Provider, MD   lisinopril (PRINIVIL;ZESTRIL) 10 MG tablet lisinopril 10 mg tablet   Take 1 tablet every day by oral route. Historical Provider, MD   Lutein-Zeaxanthin 25-5 MG CAPS lutein 20 mg capsule    Historical Provider, MD   acetaminophen (TYLENOL) 325 MG tablet Tylenol   1 po qd PRN    Historical Provider, MD   Cholecalciferol (VITAMIN D3) 400 UNIT/ML LIQD Take by mouth    Historical Provider, MD       REVIEW OF SYSTEMS  (2-9 systems for level 4, 10 ormore for level 5)      Review of Systems   Constitutional: Negative. HENT: Negative. Eyes: Negative. Respiratory: Negative. Cardiovascular: Negative.     Gastrointestinal: Positive for nausea. Negative for abdominal distention, abdominal pain, anal bleeding, blood in stool, constipation, diarrhea, rectal pain and vomiting. Intermittent nausea, no vomiting   Endocrine: Negative. Genitourinary: Negative. Musculoskeletal: Negative. Skin: Negative. Neurological: Positive for headaches. Negative for dizziness, tremors, seizures, syncope, facial asymmetry, speech difficulty, weakness, light-headedness and numbness. Hematological: Negative. Psychiatric/Behavioral: Negative. All other systems negative except as marked. PHYSICAL EXAM  (up to 7 for level 4, 8 or more for level 5)      INITIAL VITALS:  height is 5' 7\" (1.702 m) and weight is 67.1 kg (148 lb). Her oral temperature is 98.4 °F (36.9 °C). Her blood pressure is 186/96 (abnormal) and her pulse is 66. Her respiration is 15 and oxygen saturation is 93%. Vital signs reviewed. Physical Exam  Constitutional:       Appearance: Normal appearance. HENT:      Head: Normocephalic. Right Ear: Tympanic membrane, ear canal and external ear normal.      Left Ear: Tympanic membrane, ear canal and external ear normal.      Nose: Nose normal.      Mouth/Throat:      Mouth: Mucous membranes are moist.      Pharynx: Oropharynx is clear. Eyes:      Extraocular Movements: Extraocular movements intact. Conjunctiva/sclera: Conjunctivae normal.      Pupils: Pupils are equal, round, and reactive to light. Cardiovascular:      Rate and Rhythm: Normal rate and regular rhythm. Pulses: Normal pulses. Heart sounds: Normal heart sounds. Pulmonary:      Effort: Pulmonary effort is normal.      Breath sounds: Normal breath sounds. Abdominal:      General: Abdomen is flat. Bowel sounds are normal. There is no distension. Palpations: Abdomen is soft. There is no mass. Tenderness: There is no abdominal tenderness.  There is no right CVA tenderness, left CVA tenderness, guarding or rebound. Musculoskeletal:         General: Normal range of motion. Cervical back: Normal range of motion. No rigidity or tenderness. Lymphadenopathy:      Cervical: No cervical adenopathy. Skin:     General: Skin is warm and dry. Capillary Refill: Capillary refill takes less than 2 seconds. Neurological:      Mental Status: She is alert and oriented to person, place, and time. Psychiatric:         Mood and Affect: Mood normal.         Behavior: Behavior normal.         Thought Content: Thought content normal.         Judgment: Judgment normal.           DIFFERENTIAL DIAGNOSIS / MDM     Upon exam, patient resting in her room with her  at bedside. She reports the pain is about a 10/10 in her head. She states she had a CT done back last October because of her headaches, which was normal.  These headaches are not new for her, she does have a history of headaches as well as hypertension. She states she does not believe these are migraines, but has been told that they may be. Her headache is primarily behind both eyes, radiates to the top of her head. She denies any vision changes. She denies any photophobia currently. She denies any numbness or tingling in her extremities. Upper extremity strength equal bilaterally, lower extremity strength equal laterally, no facial asymmetry is noted, patient's gait is steady. Heart sounds within normal limits upon auscultation. Lung sounds are clear and equal bilaterally. Bowel sounds are present in all 4 quadrants with auscultation. No abdominal pain with palpation, no distention, no guarding, no CVA tenderness, no mass noted. We will order CBC, CMP, lipase as well as give patient an IV with some fluids. Will give patient a round of Zofran, Decadron, Tylenol and Benadryl to see if this improves her symptoms as well as improves her blood pressure.     Patient does report slight improvement of her symptoms, blood pressure is significant improved at 156/79. Patient does take lisinopril and carvedilol at home for her blood pressure. White count is within normal limits. Lipase slightly elevated at 91, but she is experiencing no abdominal pain whatsoever. Imitrex magnesium and Norflex ordered for patient to see this helps get her headache. 2135 - sign out given to Dr Kavita Allen. PLAN (LABS / IMAGING / EKG):  Orders Placed This Encounter   Procedures    CBC with Auto Differential    Comprehensive Metabolic Panel w/ Reflex to MG    Lipase    Insert peripheral IV       MEDICATIONS ORDERED:  Orders Placed This Encounter   Medications    0.9 % sodium chloride bolus    ondansetron (ZOFRAN) injection 4 mg    dexamethasone (DECADRON) injection 10 mg    acetaminophen (TYLENOL) tablet 1,000 mg    diphenhydrAMINE (BENADRYL) injection 25 mg    SUMAtriptan (IMITREX) injection 6 mg    magnesium sulfate 1000 mg in dextrose 5% 100 mL IVPB    orphenadrine (NORFLEX) injection 60 mg    DISCONTD: nalbuphine (NUBAIN) injection 10 mg    DISCONTD: insulin regular (HUMULIN R;NOVOLIN R) injection 5 Units    DISCONTD: 0.9 % sodium chloride bolus    DISCONTD: droperidol (INAPSINE) injection 2.5 mg    DISCONTD: HYDROmorphone (DILAUDID) injection 1 mg    HYDROmorphone (DILAUDID) injection 1 mg    HYDROmorphone (DILAUDID) injection 0.5 mg       Controlled Substances Monitoring:     DIAGNOSTIC RESULTS     EKG: All EKG's are interpreted by the Emergency Department Physician who either signs or Co-signs this chart in the absenceof a cardiologist.      RADIOLOGY: All images are read by the radiologist and their interpretations are reviewed. No orders to display       No results found.     LABS:  Results for orders placed or performed during the hospital encounter of 04/03/22   CBC with Auto Differential   Result Value Ref Range    WBC 10.0 3.5 - 11.0 k/uL    RBC 4.48 4.0 - 5.2 m/uL    Hemoglobin 14.6 12.0 - 16.0 g/dL    Hematocrit 42.4 36 - 46 %    MCV 94.8 80 - 100 fL    MCH 32.5 26 - 34 pg    MCHC 34.3 31 - 37 g/dL    RDW 12.4 (L) 12.5 - 15.4 %    Platelets 067 240 - 493 k/uL    MPV 7.7 6.0 - 12.0 fL    Seg Neutrophils 66 36 - 66 %    Lymphocytes 24 24 - 44 %    Monocytes 10 2 - 11 %    Eosinophils % 0 (L) 1 - 4 %    Basophils 0 0 - 2 %    Segs Absolute 6.50 1.8 - 7.7 k/uL    Absolute Lymph # 2.30 1.0 - 4.8 k/uL    Absolute Mono # 1.00 0.1 - 1.2 k/uL    Absolute Eos # 0.00 0.0 - 0.4 k/uL    Basophils Absolute 0.00 0.0 - 0.2 k/uL   Comprehensive Metabolic Panel w/ Reflex to MG   Result Value Ref Range    Glucose 97 70 - 99 mg/dL    BUN 15 8 - 23 mg/dL    CREATININE 0.48 (L) 0.50 - 0.90 mg/dL    Calcium 9.4 8.6 - 10.4 mg/dL    Sodium 131 (L) 135 - 144 mmol/L    Potassium 4.1 3.7 - 5.3 mmol/L    Chloride 93 (L) 98 - 107 mmol/L    CO2 25 20 - 31 mmol/L    Anion Gap 13 9 - 17 mmol/L    Alkaline Phosphatase 73 35 - 104 U/L    ALT 30 5 - 33 U/L    AST 23 <32 U/L    Total Bilirubin 0.34 0.3 - 1.2 mg/dL    Total Protein 7.1 6.4 - 8.3 g/dL    Albumin 4.4 3.5 - 5.2 g/dL    Albumin/Globulin Ratio 1.6 1.0 - 2.5    GFR Non-African American >60 >60 mL/min    GFR African American >60 >60 mL/min    GFR Comment         Lipase   Result Value Ref Range    Lipase 91 (H) 13 - 60 U/L       EMERGENCY DEPARTMENT COURSE           Vitals:    Vitals:    04/03/22 2344 04/03/22 2349 04/03/22 2354 04/03/22 2358   BP: (!) 211/96 (!) 196/96  (!) 186/96   Pulse:       Resp:       Temp:       TempSrc:       SpO2: 96% 96% 96% 93%   Weight:       Height:         -------------------------  BP: (!) 186/96, Temp: 98.4 °F (36.9 °C), Pulse: 66, Resp: 15      RE-EVALUATION:  See ED Course notes above. CONSULTS:  None    PROCEDURES:  None    FINAL IMPRESSION      1. Acute nonintractable headache, unspecified headache type    2. Essential hypertension          DISPOSITION / PLAN     CONDITION ON DISPOSITION:   Good / Stable for discharge.      PATIENT REFERRED TO:  Terell Ly MD  [address] [facility] [address]  [phone]    Schedule an appointment as soon as possible for a visit in 1 day      Lisbeth Portillo MD  [address] Juliette Neff [address] [place], [place] [address] [phone]    Schedule an appointment as soon as possible for a visit in 2 days      [facility] ED  [address]   [address] [address]  [phone]  Go to   If symptoms worsen      DISCHARGE MEDICATIONS:  Discharge Medication List as of 4/3/2022 11:50 PM          SANKET Minaya - NP   Emergency Medicine Nurse Practitioner    (Please note that portions of this note were completed with a voice recognition program.  Efforts were made to edit the dictations but occasionally words aremis-transcribed.)     SANKET Minaya NP  04/03/22 8028       SANKET Minaya NP  04/05/22 1113

## 2022-04-04 VITALS
WEIGHT: 148 LBS | HEART RATE: 66 BPM | BODY MASS INDEX: 23.23 KG/M2 | RESPIRATION RATE: 15 BRPM | TEMPERATURE: 98.4 F | OXYGEN SATURATION: 93 % | SYSTOLIC BLOOD PRESSURE: 186 MMHG | HEIGHT: 67 IN | DIASTOLIC BLOOD PRESSURE: 96 MMHG

## 2022-09-23 ENCOUNTER — HOSPITAL ENCOUNTER (EMERGENCY)
Age: 67
Discharge: HOME | End: 2022-09-23
Attending: EMERGENCY MEDICINE
Payer: MEDICARE

## 2022-09-23 VITALS
RESPIRATION RATE: 18 BRPM | BODY MASS INDEX: 22.13 KG/M2 | DIASTOLIC BLOOD PRESSURE: 69 MMHG | TEMPERATURE: 98.2 F | OXYGEN SATURATION: 100 % | HEART RATE: 72 BPM | WEIGHT: 141 LBS | SYSTOLIC BLOOD PRESSURE: 160 MMHG | HEIGHT: 67 IN

## 2022-09-23 DIAGNOSIS — R51.9 INTRACTABLE EPISODIC HEADACHE, UNSPECIFIED HEADACHE TYPE: Primary | ICD-10-CM

## 2022-09-23 PROCEDURE — 99281 EMR DPT VST MAYX REQ PHY/QHP: CPT

## 2022-09-23 RX ORDER — 0.9 % SODIUM CHLORIDE 0.9 %
1000 INTRAVENOUS SOLUTION INTRAVENOUS ONCE
Status: DISCONTINUED | OUTPATIENT
Start: 2022-09-23 | End: 2022-09-23 | Stop reason: HOSPADM

## 2022-09-23 RX ORDER — DEXAMETHASONE SODIUM PHOSPHATE 10 MG/ML
10 INJECTION, SOLUTION INTRAMUSCULAR; INTRAVENOUS ONCE
Status: DISCONTINUED | OUTPATIENT
Start: 2022-09-23 | End: 2022-09-23 | Stop reason: HOSPADM

## 2022-09-23 RX ORDER — CARVEDILOL 12.5 MG/1
12.5 TABLET ORAL 2 TIMES DAILY WITH MEALS
COMMUNITY

## 2022-09-23 RX ORDER — LOSARTAN POTASSIUM AND HYDROCHLOROTHIAZIDE 12.5; 5 MG/1; MG/1
1 TABLET ORAL DAILY
COMMUNITY

## 2022-09-23 RX ORDER — ONDANSETRON 2 MG/ML
4 INJECTION INTRAMUSCULAR; INTRAVENOUS ONCE
Status: DISCONTINUED | OUTPATIENT
Start: 2022-09-23 | End: 2022-09-23 | Stop reason: HOSPADM

## 2022-09-23 RX ORDER — DIPHENHYDRAMINE HYDROCHLORIDE 50 MG/ML
25 INJECTION INTRAMUSCULAR; INTRAVENOUS ONCE
Status: DISCONTINUED | OUTPATIENT
Start: 2022-09-23 | End: 2022-09-23 | Stop reason: HOSPADM

## 2022-09-23 RX ORDER — PRAVASTATIN SODIUM 40 MG
40 TABLET ORAL DAILY
COMMUNITY

## 2022-09-23 ASSESSMENT — PAIN - FUNCTIONAL ASSESSMENT: PAIN_FUNCTIONAL_ASSESSMENT: 0-10

## 2022-09-23 ASSESSMENT — PAIN DESCRIPTION - LOCATION: LOCATION: HEAD

## 2022-09-23 ASSESSMENT — PAIN SCALES - GENERAL: PAINLEVEL_OUTOF10: 10

## 2022-09-23 ASSESSMENT — PAIN DESCRIPTION - PAIN TYPE: TYPE: CHRONIC PAIN

## 2022-09-23 NOTE — ED PROVIDER NOTES
[address] [facility] ED  [address] [facility] [address]  Phone: [phone]  Fax: Radha Blandmar [phone]      Pt Name: Fatuma Aguirre  MRN: [id number]  [place] 1955  Date of evaluation: 9/23/22      CHIEF COMPLAINT:   Chief Complaint   Patient presents with    Headache     HISTORY OF PRESENT ILLNESS    Fatuma Aguirre is a 77 y.o. female who presents with a CEPHALGIA complaint:     \"Worst headache of your life? \"     NO  Typical in location/character to previous headaches? YES  Gradually worsening? YES   Nausea? YES    Vomiting? NO  Photophobia? YES  Vision changes? NO  Significant URI/sinusitis? NO  Neck pain? NO  Fevers? NO  Timeframe/context? Patient here with  for evaluation of afebrile and nontraumatic headache. Patient states is been going on for a number of days.  reports that she has been having headaches like this for over a year. Patient is managed by [facility] neurology as well as via her PCP. They do identify that she was just giving a recent pain management referral that is still forthcoming. Patient confirms that this is similar in character location to her episodic headaches, with it being described as global but a little more bilateral/frontal.  Patient states that that her neurology office has completed MR and CT imaging and they have not come up with anything as a diagnosis. Patient denies any new vision changes, fever/chills, new neck pain, any other chest-respiratory-abdominal-urinary pain or symptoms. Patient reports she does have some hypertension of that when she was here earlier in the spring she was having some blood pressure issues as well. No ENT/URI symptoms. Modifying factors? As above        Nursing Notes were reviewed. REVIEW OF SYSTEMS     Constitutional: per HPI  Eyes: per HPI   Neck: No neck pain. Respiratory: Denies recent shortness of breath.     Cardiac:  Denies recent chest pain. GI:  Per HPI  : Denies dysuria. Musculoskeletal: per HPI  Neurologic:  Per HPI  Skin:  Denies any rash. Negative in 10 essential Systems except as mentioned above and in the HPI. PAST MEDICAL HISTORY   PMH:  has a past medical history of Anxiety, Cancer ([address] Hypertension, and Osteoarthritis. Surgical History:  has a past surgical history that includes Cystoscopy (2013); Tonsillectomy (1959); and Breast lumpectomy (Left, 2011). Social History:  reports that she has never smoked. She has never used smokeless tobacco. She reports that she does not use drugs. Family History: Noncontributory   Psychiatric History: Noncontributory     Allergies:is allergic to aspirin, ibuprofen, and naproxen. PHYSICAL EXAM     INITIAL VITALS: BP (!) 160/69   Pulse 72   Temp 98.2 °F (36.8 °C) (Oral)   Resp 18   Ht 5' 7\" (1.702 m)   Wt 64 kg (141 lb)   SpO2 100%   BMI 22.08 kg/m²     Constitutional:  Well developed, alert and age appropriate interaction,  anxious, pain distress but nontoxic. Eyes:  Pupils equal and readily reactive to light at 3 mm bilaterally  HENT:  Atraumatic, external ears normal, bilat TM wnl.  nose normal, oropharynx moist.  No tenderness to palpation of the temporal arteries. Neck:  No midline pain. Full range of motion. Supple with no meningismus. Respiratory:  Clear to auscultation bilaterally with good air exchange, no W/R/R  Cardiovascular:  RRR with normal S1 and S2  Musculoskeletal:  No edema, no tenderness, no deformities, full ROM x 4 and NV intact distally  Back:  No midline tenderness or pain with range of motion. No CVA tenderness. Integument:  No rash. Neuro examination:    Answering questions appropriately. No gaze deficit. No  Moving all extremities no SILT of BUE/BLE. No visual field deficits. Extraocular movements intact. Pt can raise eyebrows and close eyes tight. Hearing intact bilaterally.   Uvula midline and no difficulty swallowing. Can rotate head side to side, active ROM w/o deficit. EMERGENCY DEPARTMENT COURSE:     Orders Placed This Encounter   Medications    DISCONTD: HYDROmorphone (DILAUDID) injection 1 mg    DISCONTD: dexamethasone (PF) (DECADRON) injection 10 mg    DISCONTD: diphenhydrAMINE (BENADRYL) injection 25 mg    DISCONTD: ondansetron (ZOFRAN) injection 4 mg    DISCONTD: 0.9 % sodium chloride bolus     1350  Nursing informs me that patient eloped after my evaluation and discussion of our treatment plan with her. Patient has a chronic history of headaches for which she is primarily seen at [facility]. Patient was nontoxic on my exam and without any deficits. I had a lengthy discussion with patient regarding our approach to chronic headaches and not providing narcotic medicines predominantly. She confirms that she sees Neurology for her headaches and has had recent head imaging as well as having a recent pain management referral.  So with all that being said that establishes the chronicity of her episodic headache history. There has been no new fevers or trauma that she reports and she confirms that this headache is similar in character and location to previous. In review of her previous visits it looks as though near all nonnarcotic medicines were provided to her without relief at her ED visit here on 4/3/22 and she did end up receiving some Dilaudid. She was discharged home with improvement of symptoms. She was here for ~ 5 hrs during that visit. With that history and our present high census today I did order her nonnarcotic meds as well as some Dilaudid. In the interim  the patient informed the nurse that she was leaving because she needed medicines more promptly and what we are going to give her \"is not going to work anyways\". She left before receiving any medicines here but I did complete by examination.        DIAGNOSTIC RESULTS     EKG: All EKG's are interpreted by the Emergency Department Physician who either signs or Co-signs this chart in the absence of a cardiologist.  Not indicated    RADIOLOGY:   Non-plain film images such as CT, Ultrasound and MRI are read by the radiologist. Plain radiographic images are visualized and preliminarily interpreted by the emergency physician with the below findings:    Not indicated    Interpretation per the Radiologist below, if available at the time of this note:    No orders to display       LABS:  Labs Reviewed - No data to display  Not indicated    All other labs were within normal range or not returned as of this dictation. FINAL IMPRESSION:     1. Intractable episodic headache, unspecified headache type          DISPOSITION:  DISPOSITION Eloped - Left Before Treatment Complete 09/23/2022 02:06:00 PM        PATIENT REFERRED TO:  No follow-up provider specified.     DISCHARGE MEDICATIONS:  Discharge Medication List as of 9/23/2022  2:10 PM          (Please note that portions of this note were completed with a voice recognition program.  Efforts were made to edit the dictations but occasionally words are mis-transcribed.)          Maxi Correia PA-C  09/24/22 2041

## 2022-09-23 NOTE — ED NOTES
Patient walks to nurses station, she informs this RN that she is leaving. She states that she has been waiting a hour and that the medications that we are going to give her will not help. Patient ambulatory with steady gait, leaving with her spouse.       Bandar Carver RN  09/23/22 6809

## 2024-08-20 ENCOUNTER — HOSPITAL ENCOUNTER (EMERGENCY)
Age: 69
Discharge: HOME | End: 2024-08-20
Attending: EMERGENCY MEDICINE
Payer: MEDICARE

## 2024-08-20 ENCOUNTER — APPOINTMENT (OUTPATIENT)
Dept: CT IMAGING | Age: 69
End: 2024-08-20
Payer: MEDICARE

## 2024-08-20 VITALS
HEIGHT: 67 IN | SYSTOLIC BLOOD PRESSURE: 181 MMHG | TEMPERATURE: 98.1 F | HEART RATE: 76 BPM | DIASTOLIC BLOOD PRESSURE: 94 MMHG | BODY MASS INDEX: 21.97 KG/M2 | WEIGHT: 140 LBS | OXYGEN SATURATION: 96 % | RESPIRATION RATE: 14 BRPM

## 2024-08-20 DIAGNOSIS — S00.01XA ABRASION OF SCALP, INITIAL ENCOUNTER: ICD-10-CM

## 2024-08-20 DIAGNOSIS — S09.90XA CLOSED HEAD INJURY, INITIAL ENCOUNTER: Primary | ICD-10-CM

## 2024-08-20 DIAGNOSIS — S00.03XA HEMATOMA OF SCALP, INITIAL ENCOUNTER: ICD-10-CM

## 2024-08-20 PROCEDURE — 6360000002 HC RX W HCPCS

## 2024-08-20 PROCEDURE — 70450 CT HEAD/BRAIN W/O DYE: CPT

## 2024-08-20 PROCEDURE — 99284 EMERGENCY DEPT VISIT MOD MDM: CPT

## 2024-08-20 PROCEDURE — 90471 IMMUNIZATION ADMIN: CPT

## 2024-08-20 PROCEDURE — 90715 TDAP VACCINE 7 YRS/> IM: CPT

## 2024-08-20 PROCEDURE — 72125 CT NECK SPINE W/O DYE: CPT

## 2024-08-20 RX ORDER — ONDANSETRON 4 MG/1
4 TABLET, FILM COATED ORAL 3 TIMES DAILY PRN
Qty: 15 TABLET | Refills: 0 | Status: SHIPPED | OUTPATIENT
Start: 2024-08-20

## 2024-08-20 RX ADMIN — TETANUS TOXOID, REDUCED DIPHTHERIA TOXOID AND ACELLULAR PERTUSSIS VACCINE, ADSORBED 0.5 ML: 5; 2.5; 8; 8; 2.5 SUSPENSION INTRAMUSCULAR at 15:40

## 2024-08-20 ASSESSMENT — PAIN DESCRIPTION - LOCATION: LOCATION: BACK

## 2024-08-20 ASSESSMENT — PAIN DESCRIPTION - ORIENTATION: ORIENTATION: LEFT

## 2024-08-20 ASSESSMENT — PAIN - FUNCTIONAL ASSESSMENT: PAIN_FUNCTIONAL_ASSESSMENT: 0-10

## 2024-08-20 ASSESSMENT — PAIN SCALES - GENERAL: PAINLEVEL_OUTOF10: 5

## 2024-08-20 NOTE — ED PROVIDER NOTES
[facility] Emergency Department  [address]  [facility] [address]  Phone: [phone]  Fax: [phone]        Pt Name: Margaret Schoenlein  MRN: [id number]  Birthdate 1955  Date of evaluation: 24    CHIEFCOMPLAINT       Chief Complaint   Patient presents with    Fall    Head Injury     Pt arrives dt co falling back on the side walk hitting back of head. Pt denies loc, pt denies any blood thinners.        HISTORY OF PRESENT ILLNESS (Location/Symptom, Timing/Onset, Context/Setting, Quality, Duration, Modifying Factors, Severity)      Margaret Schoenlein is a 68 y.o. female with no pertinent PMH who presents to the ED via private auto with closed head injury and scalp hematoma after mechanical fall prior to arrival. Patient is not on  blood thinners, no LOC, moderate amt of bleeding. Golf ball size posterior scalp hematoma. Treatments compression to scalp. No neuro deficits, alert/oriented x 3. No other injuries. No other current medical complaints.    PAST MEDICAL / SURGICAL / SOCIAL / FAMILY HISTORY     PMH:  has a past medical history of Anxiety, Cancer (HCC), Hypertension, and Osteoarthritis.  Surgical History:  has a past surgical history that includes Cystoscopy (); Tonsillectomy (); and Breast lumpectomy (Left, ).  Social History:  reports that she has never smoked. She has never used smokeless tobacco. She reports that she does not use drugs.  Family History: She indicated that her mother is . She indicated that her father is . She indicated that the status of her brother is unknown.   family history includes Cancer in her father and mother; Heart Attack in her mother; Heart Disease in her brother; High Blood Pressure in her mother; High Cholesterol in her mother; Stroke in her mother.  Psychiatric History: None    Allergies: Aspirin, Ibuprofen, and Naproxen    Home Medications:   Prior to Admission medications    Medication Sig Start Date 
providers is based on my personal performance of the HPI, PE and MDM. For Residents, Physician Assistant/ Nurse Practitioner cases/documentation I have personally evaluated this patient and have completed at least one if not all key elements of the E/M (history, physical exam, and MDM). Additional findings are as noted.    Head injury to the right occipital area, no LOC,  no focal complaints.  CT scan of the brain is unremarkable.  Distal neurovascularly intact.  Wound care instructions given to the patient.    At this time the patient is without objective evidence of an acute process requiring hospitalization or inpatient management.  The patient has remained hemodynamically stable.  No additional indication for emergency studies at this time.  I answered all questions.  Discussed discharge instructions including standard anticipatory guidance and what should prompt a return to the emergency department, including if they get worse, are not getting better, or develop any new or concerning symptoms.  I have given a specific timeframe in which to follow-up, and who to follow-up with.  The patient demonstrate understanding.  Patient is nontoxic and stable for discharge with outpatient follow-up.      (Please note that portions of this note were completed with a voice recognition program.  Efforts were made to edit the dictations but occasionally words are mis-transcribed.)    Shana Mackey DO  Attending Emergency Medicine Physician        Shana Mackey, DO  08/20/24 1516       Shana Mackey DO  08/20/24 3000

## 2024-08-20 NOTE — DISCHARGE INSTRUCTIONS
Use tylenol or motrin for headache. Apply ice and compression to scalp hematoma several times a day for the first few days.   Use zofran as needed for any Nausea or vomiting related to your head injury.

## 2024-11-13 ENCOUNTER — HOSPITAL ENCOUNTER (OUTPATIENT)
Dept: PHYSICAL THERAPY | Facility: CLINIC | Age: 69
Setting detail: THERAPIES SERIES
Discharge: HOME | End: 2024-11-13
Payer: MEDICARE

## 2024-11-13 PROCEDURE — 97161 PT EVAL LOW COMPLEX 20 MIN: CPT

## 2024-11-13 NOTE — CONSULTS
Re-ed      []  Gait Training      []  Manual Therapy      []  Ther Activities      []  Aquatics      []  Vasocompression      []  Cervical Traction      []  Other      Total Billable time 50 min             TOTAL TREATMENT TIME: 50    Time in:1010   Time Out:1100    Electronically signed by: Amber Vazquez PT        Physician Signature:________________________________Date:__________________  By signing above or cosigning this note, I have reviewed this plan of care and certify a need for medically necessary rehabilitation services.     *PLEASE SIGN ABOVE AND FAX BACK ALL PAGES*

## 2024-11-22 ENCOUNTER — HOSPITAL ENCOUNTER (OUTPATIENT)
Dept: PHYSICAL THERAPY | Facility: CLINIC | Age: 69
Setting detail: THERAPIES SERIES
Discharge: HOME | End: 2024-11-22
Payer: MEDICARE

## 2024-11-22 PROCEDURE — 97110 THERAPEUTIC EXERCISES: CPT

## 2024-11-22 PROCEDURE — 97140 MANUAL THERAPY 1/> REGIONS: CPT

## 2024-11-22 NOTE — FLOWSHEET NOTE
[] [facility]  Outpatient Rehabilitation &  Therapy  [address]  [phone]  [phone] [] [facility]  Outpatient Rehabilitation &  Therapy  [address]  P: [phone]  F: [phone] [x] [facility]  Outpatient Rehabilitation &  Therapy  [address]  P: [phone]  F: [phone] [] [facility]  Outpatient Rehabilitation &  Therapy  [address]  [phone]  [phone] [] [facility]  Outpatient Rehabilitation &  Therapy  [address]   P: [phone]  F: [phone]  [] [facility]  Outpatient Rehabilitation &  Therapy  [address]  P: [phone]  F: [phone] [] [facility]  Outpatient Rehabilitation &  Therapy  [address]  P: [phone]  F: [phone] [] [facility]  Outpatient Rehabilitation &  Therapy  [address]  P: [phone]  F: [phone] [] [facility]  Outpatient Rehabilitation &  Therapy  [address]  P: [phone]  F: [phone]  [] [facility] Outpatient Rehabilitation &  Therapy  [address]  P: [phone]  F: [phone]     Physical Therapy Daily Treatment Note    Date:  2024  Patient Name:  Margaret Schoenlein    :  1955  MRN: [id number]  Physician: Nils Bustamante MD                              Insurance: [facility] Medicare. $40.00 co-pay. AUTH AFTER EVAL: 4 visits 24-24  Medical Diagnosis: M25.552 (ICD-10-CM) - Pain in left hip                        Rehab Codes:  M25.652, R26.2  Onset date: 24                 Next 's appt.: unknown     Visit# / total visits: ; Progress note for Medicare patient due at visit 10     Cancels/No Shows: 0    Subjective:    Pain:  [x] Yes  [] No Location: left hip  Pain Rating: (0-10 scale)

## 2024-11-25 ENCOUNTER — HOSPITAL ENCOUNTER (OUTPATIENT)
Dept: PHYSICAL THERAPY | Facility: CLINIC | Age: 69
Setting detail: THERAPIES SERIES
Discharge: HOME | End: 2024-11-25
Payer: MEDICARE

## 2024-11-25 PROCEDURE — 97110 THERAPEUTIC EXERCISES: CPT

## 2024-11-25 PROCEDURE — 97140 MANUAL THERAPY 1/> REGIONS: CPT

## 2024-11-27 ENCOUNTER — HOSPITAL ENCOUNTER (OUTPATIENT)
Dept: PHYSICAL THERAPY | Facility: CLINIC | Age: 69
Setting detail: THERAPIES SERIES
Discharge: HOME | End: 2024-11-27
Payer: MEDICARE

## 2024-11-27 PROCEDURE — 97140 MANUAL THERAPY 1/> REGIONS: CPT

## 2024-11-27 PROCEDURE — 97110 THERAPEUTIC EXERCISES: CPT

## 2024-11-27 NOTE — FLOWSHEET NOTE
[] [facility]  Outpatient Rehabilitation &  Therapy  [address]  [phone]  [phone] [] [facility]  Outpatient Rehabilitation &  Therapy  [address]  P: [phone]  F: [phone] [x] [facility]  Outpatient Rehabilitation &  Therapy  [address]  P: [phone]  F: [phone] [] [facility]  Outpatient Rehabilitation &  Therapy  [address]  [phone]  [phone] [] [facility]  Outpatient Rehabilitation &  Therapy  [address]   P: [phone]  F: [phone]  [] [facility]  Outpatient Rehabilitation &  Therapy  [address]  P: [phone]  F: [phone] [] [facility]  Outpatient Rehabilitation &  Therapy  [address]  P: [phone]  F: [phone] [] [facility]  Outpatient Rehabilitation &  Therapy  [address]  P: [phone]  F: [phone] [] [facility]  Outpatient Rehabilitation &  Therapy  [address]  P: [phone]  F: [phone]  [] [facility] Outpatient Rehabilitation &  Therapy  [address]  P: [phone]  F: [phone]     Physical Therapy Daily Treatment Note    Date:  2024  Patient Name:  Margaret Schoenlein    :  1955  MRN: [id number]  Physician: Nils Bustamante MD                              Insurance: [facility] Medicare. $40.00 co-pay. AUTH AFTER EVAL: 4 visits 24-24  Medical Diagnosis: M25.552 (ICD-10-CM) - Pain in left hip                        Rehab Codes:  M25.652, R26.2  Onset date: 24                 Next 's appt.: unknown     Visit# / total visits: ; Progress note for Medicare patient due at visit 10     Cancels/No Shows: 0    Subjective:    Pain:  [x] Yes  [] No Location: left hip  Pain Rating: (0-10 scale)

## 2024-12-02 ENCOUNTER — HOSPITAL ENCOUNTER (OUTPATIENT)
Dept: PHYSICAL THERAPY | Facility: CLINIC | Age: 69
Setting detail: THERAPIES SERIES
Discharge: HOME | End: 2024-12-02
Payer: MEDICARE

## 2024-12-02 PROCEDURE — 97140 MANUAL THERAPY 1/> REGIONS: CPT

## 2024-12-02 PROCEDURE — 97110 THERAPEUTIC EXERCISES: CPT

## 2024-12-02 NOTE — PROGRESS NOTES
[] [facility]  Outpatient Rehabilitation &  Therapy  [address]  [phone]  [phone] [] [facility]  Outpatient Rehabilitation &  Therapy  [address]  P: [phone]  F: [phone] [x] [facility]  Outpatient Rehabilitation &  Therapy  [address]  P: [phone]  F: [phone] [] [facility]  Outpatient Rehabilitation &  Therapy  [address]  [phone]  [phone] [] [facility]  Outpatient Rehabilitation &  Therapy  [address]   P: [phone]  F: [phone]  [] [facility]  Outpatient Rehabilitation &  Therapy  [address]  P: [phone]  F: [phone] [] [facility]  Outpatient Rehabilitation &  Therapy  [address]  P: [phone]  F: [phone] [] [facility]  Outpatient Rehabilitation &  Therapy  [address]  P: [phone]  F: [phone] [] [facility]  Outpatient Rehabilitation &  Therapy  [address]  P: [phone]  F: [phone]  [] [facility] Outpatient Rehabilitation &  Therapy  [address]  P: [phone]  F: [phone]     Physical Therapy Daily Treatment Note/Progress Note    Date:  2024  Patient Name:  Margaret Schoenlein    :  1955  MRN: [id number]  Physician: Nils Bustamante MD                              Insurance: [facility] Medicare. $40.00 co-pay. AUTH AFTER EVAL: 4 visits 24-24.[employer] approved 5 add'l PT vs valid 24 - 25; CPT codes 02646, 76600; Auth #[id number]   Medical Diagnosis: M25.552 (ICD-10-CM) - Pain in left hip                        Rehab Codes:  M25.652, R26.2  Onset date: 24                 Next 's appt.: unknown     Visit# / total visits: 5/10; Progress note for Medicare patient due at visit

## 2024-12-04 ENCOUNTER — HOSPITAL ENCOUNTER (OUTPATIENT)
Dept: PHYSICAL THERAPY | Facility: CLINIC | Age: 69
Setting detail: THERAPIES SERIES
Discharge: HOME | End: 2024-12-04
Payer: MEDICARE

## 2024-12-04 PROCEDURE — 97110 THERAPEUTIC EXERCISES: CPT

## 2024-12-04 NOTE — FLOWSHEET NOTE
[] [facility]  Outpatient Rehabilitation &  Therapy  [address]  [phone]  [phone] [] [facility]  Outpatient Rehabilitation &  Therapy  [address]  P: [phone]  F: [phone] [x] [facility]  Outpatient Rehabilitation &  Therapy  [address]  P: [phone]  F: [phone] [] [facility]  Outpatient Rehabilitation &  Therapy  [address]  [phone]  [phone] [] [facility]  Outpatient Rehabilitation &  Therapy  [address]   P: [phone]  F: [phone]  [] [facility]  Outpatient Rehabilitation &  Therapy  [address]  P: [phone]  F: [phone] [] [facility]  Outpatient Rehabilitation &  Therapy  [address]  P: [phone]  F: [phone] [] [facility]  Outpatient Rehabilitation &  Therapy  [address]  P: [phone]  F: [phone] [] [facility]  Outpatient Rehabilitation &  Therapy  [address]  P: [phone]  F: [phone]  [] [facility] Outpatient Rehabilitation &  Therapy  [address]  P: [phone]  F: [phone]     Physical Therapy Daily Treatment Note    Date:  2024  Patient Name:  Margaret Schoenlein    :  1955  MRN: [id number]  Physician: Nils Bustamante MD                              Insurance: [facility] Medicare. $40.00 co-pay. AUTH AFTER EVAL: 4 visits 24-24.[employer] approved 5 add'l PT vs valid 24 - 25; CPT codes 19899, 98093; Auth #[id number]   Medical Diagnosis: M25.552 (ICD-10-CM) - Pain in left hip                        Rehab Codes:  M25.652, R26.2  Onset date: 24                 Next 's appt.: unknown     Visit# / total visits: 6/10; Progress note for Medicare patient due at visit 10     Cancels/No 
understanding.  [x] Demonstrates understanding.  [x] Needs review.  [x] Fall prevention packet given on 12/2/24  [] Demonstrates/verbalizes HEP/Ed previously given.     Access Code: [email or web address]  URL: [email or web address]  Date: 11/25/2024  Prepared by: Amber    Exercises  - Supine Butterfly Groin Stretch  - 1 x daily - 3 reps - 20-30 seconds hold  - Supine Figure 4 Piriformis Stretch  - 1 x daily - 3 reps - 20-30 seconds hold  - Supine Heel Slide  - 1 x daily - 1-2 sets - 10 reps  - Supine Hip Abduction  - 1 x daily - 1-2 sets - 10 reps  - Prone Hip Internal and External Rotation AROM  - 1 x daily - 1-2 sets - 10 reps  - Prone Hip External Rotation AROM  - 1 x daily - 1-2 sets - 10 reps  - Supine Gluteal Sets  - 1 x daily - 1-2 sets - 10 reps - 5 seconds hold    Plan: [x] Continue current frequency toward long and short term goals.    [x] Specific Instructions for subsequent treatments: see above      Time In:  1300         Time Out: 1353    Electronically signed by:  FAB Gentile      The supervising therapist was present in the room directing the student during the provision of care.  The supervising therapist made the skilled judgements and was responsible for assessments and treatments. The above documentation was reviewed and accepted by supervising Clinical Instructor, Amber Vazquez PT.

## 2024-12-12 ENCOUNTER — HOSPITAL ENCOUNTER (OUTPATIENT)
Dept: PHYSICAL THERAPY | Facility: CLINIC | Age: 69
Setting detail: THERAPIES SERIES
Discharge: HOME | End: 2024-12-12
Payer: MEDICARE

## 2024-12-12 PROCEDURE — 97110 THERAPEUTIC EXERCISES: CPT

## 2024-12-12 NOTE — FLOWSHEET NOTE
[] [facility]  Outpatient Rehabilitation &  Therapy  [address]  [phone]  [phone] [] [facility]  Outpatient Rehabilitation &  Therapy  [address]  P: [phone]  F: [phone] [x] [facility]  Outpatient Rehabilitation &  Therapy  [address]  P: [phone]  F: [phone] [] [facility]  Outpatient Rehabilitation &  Therapy  [address]  [phone]  [phone] [] [facility]  Outpatient Rehabilitation &  Therapy  [address]   P: [phone]  F: [phone]  [] [facility]  Outpatient Rehabilitation &  Therapy  [address]  P: [phone]  F: [phone] [] [facility]  Outpatient Rehabilitation &  Therapy  [address]  P: [phone]  F: [phone] [] [facility]  Outpatient Rehabilitation &  Therapy  [address]  P: [phone]  F: [phone] [] [facility]  Outpatient Rehabilitation &  Therapy  [address]  P: [phone]  F: [phone]  [] [facility] Outpatient Rehabilitation &  Therapy  [address]  P: [phone]  F: [phone]     Physical Therapy Daily Treatment Note    Date:  2024  Patient Name:  Margaret Schoenlein    :  1955  MRN: [id number]  Physician: Nils Bustamante MD                              Insurance: [facility] Medicare. $40.00 co-pay. AUTH AFTER EVAL: 4 visits 24-24.[employer] approved 5 add'l PT vs valid 24 - 25; CPT codes 72585, 86525; Auth #[id number]   Medical Diagnosis: M25.552 (ICD-10-CM) - Pain in left hip                        Rehab Codes:  M25.652, R26.2  Onset date: 24                 Next 's appt.: unknown     Visit# / total visits: 7/10; Progress note for Medicare patient due at visit 10     Cancels/No 
show

## 2024-12-23 ENCOUNTER — HOSPITAL ENCOUNTER (OUTPATIENT)
Dept: PHYSICAL THERAPY | Facility: CLINIC | Age: 69
Setting detail: THERAPIES SERIES
Discharge: HOME | End: 2024-12-23
Payer: MEDICARE

## 2024-12-23 PROCEDURE — 97110 THERAPEUTIC EXERCISES: CPT

## 2024-12-23 NOTE — FLOWSHEET NOTE
[] [facility]  Outpatient Rehabilitation &  Therapy  [address]  [phone]  [phone] [] [facility]  Outpatient Rehabilitation &  Therapy  [address]  P: [phone]  F: [phone] [x] [facility]  Outpatient Rehabilitation &  Therapy  [address]  P: [phone]  F: [phone] [] [facility]  Outpatient Rehabilitation &  Therapy  [address]  [phone]  [phone] [] [facility]  Outpatient Rehabilitation &  Therapy  [address]   P: [phone]  F: [phone]  [] [facility]  Outpatient Rehabilitation &  Therapy  [address]  P: [phone]  F: [phone] [] [facility]  Outpatient Rehabilitation &  Therapy  [address]  P: [phone]  F: [phone] [] [facility]  Outpatient Rehabilitation &  Therapy  [address]  P: [phone]  F: [phone] [] [facility]  Outpatient Rehabilitation &  Therapy  [address]  P: [phone]  F: [phone]  [] [facility] Outpatient Rehabilitation &  Therapy  [address]  P: [phone]  F: [phone]     Physical Therapy Daily Treatment Note    Date:  2024  Patient Name:  Margaret Schoenlein    :  1955  MRN: [id number]  Physician: Nils Bustamante MD                              Insurance: [facility] Medicare. $40.00 co-pay. AUTH AFTER EVAL: 4 visits 24-24.[employer] approved 5 add'l PT vs valid 24 - 25; CPT codes 26072, 17117; Auth #[id number]   Medical Diagnosis: M25.552 (ICD-10-CM) - Pain in left hip                        Rehab Codes:  M25.652, R26.2  Onset date: 24                 Next 's appt.: unknown     Visit# / total visits: 8/10; Progress note for Medicare patient due at visit 10     Cancels/No

## 2024-12-31 ENCOUNTER — HOSPITAL ENCOUNTER (OUTPATIENT)
Dept: PHYSICAL THERAPY | Facility: CLINIC | Age: 69
Setting detail: THERAPIES SERIES
Discharge: HOME | End: 2024-12-31
Payer: MEDICARE

## 2024-12-31 PROCEDURE — 97110 THERAPEUTIC EXERCISES: CPT

## 2024-12-31 NOTE — THERAPY DISCHARGE
[] [facility]  Outpatient Rehabilitation &  Therapy  [address]  [phone]  [phone] [] [facility]  Outpatient Rehabilitation &  Therapy  [address]  P: [phone]  F: [phone] [x] [facility]  Outpatient Rehabilitation &  Therapy  [address]  P: [phone]  F: [phone] [] [facility]  Outpatient Rehabilitation &  Therapy  [address]  [phone]  [phone] [] [facility]  Outpatient Rehabilitation &  Therapy  [address]   P: [phone]  F: [phone]  [] [facility]  Outpatient Rehabilitation &  Therapy  [address]  P: [phone]  F: [phone] [] [facility]  Outpatient Rehabilitation &  Therapy  [address]  P: [phone]  F: [phone] [] [facility]  Outpatient Rehabilitation &  Therapy  [address]  P: [phone]  F: [phone] [] [facility]  Outpatient Rehabilitation &  Therapy  [address]  P: [phone]  F: [phone]  [] [facility] Outpatient Rehabilitation &  Therapy  [address]  P: [phone]  F: [phone]     Physical Therapy Daily Treatment Note/Discharge Note    Date:  2024  Patient Name:  Margaret Schoenlein    :  1955  MRN: [id number]  Physician: Nils Bustamante MD                              Insurance: [facility] Medicare. $40.00 co-pay. AUTH AFTER EVAL: 4 visits 24-24.[employer] approved 5 add'l PT vs valid 24 - 25; CPT codes 08829, 33228; Auth #[id number]   Medical Diagnosis: M25.552 (ICD-10-CM) - Pain in left hip                        Rehab Codes:  M25.652, R26.2  Onset date: 24                 Next 's appt.: unknown     Visit# / total visits: ; Progress note for Medicare patient due at visit

## 2025-05-22 ENCOUNTER — HOSPITAL ENCOUNTER (OUTPATIENT)
Dept: PHYSICAL THERAPY | Facility: CLINIC | Age: 70
Setting detail: THERAPIES SERIES
Discharge: HOME | End: 2025-05-22
Payer: MEDICARE

## 2025-05-22 PROCEDURE — 97110 THERAPEUTIC EXERCISES: CPT

## 2025-05-22 PROCEDURE — 97161 PT EVAL LOW COMPLEX 20 MIN: CPT

## 2025-05-22 NOTE — CONSULTS
Tolerated well.     Problem list, as detailed above:   [x] ? Pain     [x] ? ROM    [x] ? Strength    [] ? Function:   [] ? Balance  [x] Edema  [] Postural Deviations  [x] Gait Deviations  [] Other     STG: (to be met in 6 treatments)  ? Pain:left hip to 2/10 at worst to improve waking tolerance  ? ROM: left hip abduction to at least 22 degrees to improve car transfers  ? Strength:left quad to 4/5 to improve walking tolerance  ? Strength left hip abductor to 4/5 to improve walking tolerance  Patient to be independent with home exercise program as demonstrated by performance with correct form without cues.  Demonstrate Knowledge of fall prevention met 5/23/25  LTG: (to be met in 12 treatments)  WOMAC 10% or less impaired  Improve left quad and hip abductor strength to 4+ to allow for reciprocal stairs                   Patient goals:\" ease of bending knee and improve gait\"    Rehab Potential:  [x] Good  [] Fair  [] Poor   Suggested Professional Referral:  [x] No  [] Yes:  Barriers to Goal Achievement:  [x] No  [] Yes:  Domestic Concerns:  [x] No  [] Yes:    Pt. Education:  [x] Plans/Goals, Risks/Benefits discussed  [x] Home exercise program    Method of Education: [x] Verbal  [x] Demo  [x] Written  Comprehension of Education:  [x] Verbalizes understanding.  [x] Demonstrates understanding.  [x] Needs Review.  [] Demonstrates/verbalizes understanding of HEP/Ed previously given.    Discussed fall prevention and provided handout.    Access Code: [email or web address]  URL: [email or web address]  Date: 05/22/2025  Prepared by: Amber    Exercises  - Supine Heel Slide  - 1 x daily - 1-2 sets - 10 reps  - Supine Hip Abduction  - 1 x daily - 1-2 sets - 10 reps - 5 seconds hold  - Supine Gluteal Sets  - 1 x daily - 1-2 sets - 10 reps - 5 seconds hold  - Sidelying TFL Stretch  - 1 x daily - 3 reps - 30 seconds hold  - Seated Long Arc Quad  - 1 x daily - 1-2 sets - 10 reps - 5 seconds hold  - Standing Hip Abduction with Counter

## 2025-05-28 ENCOUNTER — HOSPITAL ENCOUNTER (OUTPATIENT)
Dept: PHYSICAL THERAPY | Facility: CLINIC | Age: 70
Setting detail: THERAPIES SERIES
Discharge: HOME | End: 2025-05-28
Payer: MEDICARE

## 2025-05-28 PROCEDURE — 97110 THERAPEUTIC EXERCISES: CPT

## 2025-05-28 NOTE — FLOWSHEET NOTE
[] [facility]  Outpatient Rehabilitation &  Therapy  [address]  [phone]  [phone] [] [facility]  Outpatient Rehabilitation &  Therapy  [address]  P: [phone]  F: [phone] [x] [facility]  Outpatient Rehabilitation &  Therapy  [address]  P: [phone]  F: [phone] [] [facility]  Outpatient Rehabilitation &  Therapy  [address]  [phone]  [phone] [] [facility]  Outpatient Rehabilitation &  Therapy  [address]   P: [phone]  F: [phone]  [] [facility]  Outpatient Rehabilitation &  Therapy  [address]  P: [phone]  F: [phone] [] [facility]  Outpatient Rehabilitation &  Therapy  [address]  P: [phone]  F: [phone] [] [facility]  Outpatient Rehabilitation &  Therapy  [address]  P: [phone]  F: [phone] [] [facility]  Outpatient Rehabilitation &  Therapy  [address]  P: [phone]  F: [phone]  [] [facility] Outpatient Rehabilitation &  Therapy  [address]  P: [phone]  F: [phone]     Physical Therapy Daily Treatment Note    Date:  2025  Patient Name:  Margaret Schoenlein    :  1955  MRN: [id number]  Physician: Kb Hernandez APRN - CNP                         Insurance: [employer] Medicare, auth after eval, $35 copay,  approved 10vs after eval&treat, totaling 11vs from -25 auth#[id number]. CPTs 77709,97884,37725 oop 4500/3611.09 rem.      Medical Diagnosis: Z47.1 (ICD-10-CM) - Aftercare following joint replacement [id number] (ICD-10-CM) - Presence of left artificial hip joint                  Rehab Codes:M25.552, M25.652, R26.2   Onset date: 25               Next 's appt.: 25     Visit# /

## 2025-06-05 ENCOUNTER — HOSPITAL ENCOUNTER (OUTPATIENT)
Dept: PHYSICAL THERAPY | Facility: CLINIC | Age: 70
Setting detail: THERAPIES SERIES
Discharge: HOME | End: 2025-06-05
Payer: MEDICARE

## 2025-06-05 PROCEDURE — 97110 THERAPEUTIC EXERCISES: CPT

## 2025-06-11 PROCEDURE — 97110 THERAPEUTIC EXERCISES: CPT

## 2025-06-11 NOTE — FLOWSHEET NOTE
10-30 seconds hold  - Standing Tandem Balance with Counter Support  - 1 x daily - 2-3 reps - 10-20 seconds hold  - Backward Walking with Counter Support  - 1 x daily  - Seated Long Arc Quad with Ankle Weight  - 2-3 x weekly - 1-2 sets - 10 reps - 2 seconds hold  - Standing Hip Abduction with Resistance at Ankles and Counter Support  - 2-3 x weekly - 1-2 sets - 10 reps  - Side Stepping with Resistance at Ankles and Counter Support  - 2-3 x weekly    Plan: [x] Continue current frequency toward long and short term goals.    [x] Specific Instructions for subsequent treatments: see above      Time In:1300         Time Out: 1342    Electronically signed by:  Amber Vazquez PT

## 2025-06-18 ENCOUNTER — HOSPITAL ENCOUNTER (OUTPATIENT)
Dept: PHYSICAL THERAPY | Facility: CLINIC | Age: 70
Setting detail: THERAPIES SERIES
Discharge: HOME | End: 2025-06-18
Payer: MEDICARE

## 2025-06-18 PROCEDURE — 97110 THERAPEUTIC EXERCISES: CPT

## 2025-06-18 NOTE — FLOWSHEET NOTE
[] [facility]  Outpatient Rehabilitation &  Therapy  [address]  [phone]  [phone] [] [facility]  Outpatient Rehabilitation &  Therapy  [address]  P: [phone]  F: [phone] [x] [facility]  Outpatient Rehabilitation &  Therapy  [address]  P: [phone]  F: [phone] [] [facility]  Outpatient Rehabilitation &  Therapy  [address]  [phone]  [phone] [] [facility]  Outpatient Rehabilitation &  Therapy  [address]   P: [phone]  F: [phone]  [] [facility]  Outpatient Rehabilitation &  Therapy  [address]  P: [phone]  F: [phone] [] [facility]  Outpatient Rehabilitation &  Therapy  [address]  P: [phone]  F: [phone] [] [facility]  Outpatient Rehabilitation &  Therapy  [address]  P: [phone]  F: [phone] [] [facility]  Outpatient Rehabilitation &  Therapy  [address]  P: [phone]  F: [phone]  [] [facility] Outpatient Rehabilitation &  Therapy  [address]  P: [phone]  F: [phone]     Physical Therapy Daily Treatment Note    Date:  2025  Patient Name:  Margaret Schoenlein    :  1955  MRN: [id number]  Physician: Kb Hernandez APRN - CNP                         Insurance: [employer] Medicare, auth after eval, $35 copay,  approved 10vs after eval&treat, totaling 11vs from -25 auth#[id number]. CPTs 36821,84961,20598 oop 4500/3611.09 rem.      Medical Diagnosis: Z47.1 (ICD-10-CM) - Aftercare following joint replacement [id number] (ICD-10-CM) - Presence of left artificial hip joint                  Rehab Codes:M25.552, M25.652, R26.2   Onset date: 25               Next 's appt.: 25     Visit# /

## 2025-06-25 ENCOUNTER — HOSPITAL ENCOUNTER (OUTPATIENT)
Dept: PHYSICAL THERAPY | Facility: CLINIC | Age: 70
Setting detail: THERAPIES SERIES
Discharge: HOME | End: 2025-06-25
Payer: MEDICARE

## 2025-06-25 PROCEDURE — 97110 THERAPEUTIC EXERCISES: CPT

## 2025-06-25 NOTE — PROGRESS NOTES
Visit# / total visits: 6/11; Progress note due at visit 11     Cancels/No Shows: 0    Subjective:    Pain:  [x] Yes  [] No Location: left hip  Pain Rating: (0-10 scale) 1/10 (3/10 at worst)  Pain altered Tx:  [x] No  [] Yes  Action:  Comments: Patient states her incision is  to touch and her hip is still swollen.  She is ambulating without an assistive device.  She is able to descend the stairs reciprocally and most of the time ascends one at a time.  She states she continues to feel like her left LE is longer. She states her functional level is 75% of normal. She states she continues to limp with prolonged walking and states she is not walking as fast as normal.    Objective:    Gait Analysis: decreased left hip extension and decreased stance time L              ROM  ° A/P STRENGTH     Left Right Left Right   Hip Flex 90 105 3+ -   Ext           ER 15 22       IR 25 30       ABD 27 28 4 -   ADD           Knee Flex     4+ 5   Ext     4+ 5   Ankle DF     5 5                5/22/25 6/25/25   Functional Test: WOMAC Score: Raw score of 16/96,   which is 17% functionally impaired Score: Raw score of 13/96,   which is 14% functionally impaired                                Today’s Treatment:  Modalities: none  Precautions [x] No  [] Yes:    Exercises:  Exercise Reps/ Time Weight/ Level Comments   LAQ home  3#     Heel slides home       Glut sets home       Side lying IT-band stretch home   Not over bed- not > 20 degrees ext   Supine hip abduction        Supine bridge 10@5\"  lime      supine hooklying hip abd home  lime     Standing hip abduction home  orange     Standing hip extension <20 degrees -      clam  10x2       Step ups next  6\"     Lateal step ups next  6\"     Step overs next  4\"     Side stepping home  orange     Retro gait D/C    small steps to limit extension    gastroc stretch  3@30\"       Romberg standing home    eyes closed   Tandem standing home     SLS- partial with right foot on 8\" step

## 2025-07-02 ENCOUNTER — HOSPITAL ENCOUNTER (OUTPATIENT)
Dept: PHYSICAL THERAPY | Facility: CLINIC | Age: 70
Setting detail: THERAPIES SERIES
Discharge: HOME | End: 2025-07-02
Payer: MEDICARE

## 2025-07-02 PROCEDURE — 97110 THERAPEUTIC EXERCISES: CPT

## 2025-07-02 NOTE — FLOWSHEET NOTE
3 reps - 30 seconds hold  - Hooklying Clamshell with Resistance  - 2-3 x weekly - 1-2 sets - 10 reps - 5 seconds hold  - Supine Bridge with Resistance Band  - 3 x weekly - 1-2 sets - 10 reps - 5 seconds hold  - Narrow Stance with Counter Support  - 1 x daily - 3-5 reps - 10-30 seconds hold  - Standing Tandem Balance with Counter Support  - 1 x daily - 2-3 reps - 10-20 seconds hold  - Standing Hip Abduction with Resistance at Ankles and Counter Support  - 2-3 x weekly - 1-2 sets - 10 reps  - Standing Gastroc Stretch  - 1 x daily - 3 reps - 30 seconds hold  - Single Leg Stance  - 1 x daily - 3-5 reps - 5-20 seconds hold  - Sidelying Hip Abduction  - 3 x weekly - 1-2 sets - 10 reps  - Clamshell with Resistance  - 3 x weekly - 1-2 sets - 10 reps    Plan: [x] Continue current frequency toward long and short term goals.    [x] Specific Instructions for subsequent treatments: see above      Time In:1601       Time Out: 1650    Electronically signed by:  Amber Vazquez, PT

## 2025-07-09 ENCOUNTER — HOSPITAL ENCOUNTER (OUTPATIENT)
Dept: PHYSICAL THERAPY | Facility: CLINIC | Age: 70
Setting detail: THERAPIES SERIES
Discharge: HOME | End: 2025-07-09
Payer: MEDICARE

## 2025-07-09 PROCEDURE — 97110 THERAPEUTIC EXERCISES: CPT

## 2025-07-16 ENCOUNTER — HOSPITAL ENCOUNTER (OUTPATIENT)
Dept: PHYSICAL THERAPY | Facility: CLINIC | Age: 70
Setting detail: THERAPIES SERIES
Discharge: HOME | End: 2025-07-16
Payer: MEDICARE

## 2025-07-16 PROCEDURE — 97110 THERAPEUTIC EXERCISES: CPT

## 2025-07-16 NOTE — FLOWSHEET NOTE
hip valgus Added band to hip abduction at home. Tolerated well.       [] No change.     [] Other:  [x] Patient would continue to benefit from skilled physical therapy services in order to:  improve ROM, strength, and function including the ability to ascend stairs reciprocally.      Problem list:   [x] ? Pain                       [x] ? ROM                      [x] ? Strength                 [] ? Function:   [] ? Balance  [x] Edema  [] Postural Deviations  [x] Gait Deviations  [] Other               STG: (to be met in 6 treatments) reassessed 6/25/25  ? Pain:left hip to 2/10 at worst to improve waking tolerance progressing  ? ROM: left hip abduction to at least 22 degrees to improve car transfers met 6/11/25  ? Strength:left quad to 4/5 to improve walking tolerance met 6/18/25  ? Strength left hip abductor to 4/5 to improve walking tolerance met 6/25/25  Patient to be independent with home exercise program as demonstrated by performance with correct form without cues.progressing  Demonstrate Knowledge of fall prevention met 5/23/25  LTG: (to be met in 12 treatments)  WOMAC 10% or less impaired progressing  Improve left quad and hip abductor strength to 4+ to allow for reciprocal stairs met for quad, progressing for hip abduction                    Patient goals:\" ease of bending knee and improve gait\"    Pt. Education:  [x] Yes  [] No  [x] Reviewed Prior HEP/Ed  Method of Education: [x] Verbal  [x] Demo  [x] Written  Comprehension of Education:  [x] Verbalizes understanding.  [x] Demonstrates understanding.  [x] Needs review.  [] Demonstrates/verbalizes HEP/Ed previously given.    Access Code: [email or web address]  URL: [email or web address]  Date: 07/16/2025  Prepared by: Amber    Exercises  - Supine Heel Slide  - 1 x daily - 1-2 sets - 10 reps  - Supine Hip Abduction  - 1 x daily - 1-2 sets - 10 reps - 5 seconds hold  - Sidelying TFL Stretch  - 1 x daily - 3 reps - 30 seconds hold  - Prone Quadriceps Stretch with

## 2025-07-23 ENCOUNTER — HOSPITAL ENCOUNTER (OUTPATIENT)
Dept: PHYSICAL THERAPY | Facility: CLINIC | Age: 70
Setting detail: THERAPIES SERIES
Discharge: HOME | End: 2025-07-23
Payer: MEDICARE

## 2025-07-23 PROCEDURE — 97110 THERAPEUTIC EXERCISES: CPT

## 2025-07-23 NOTE — FLOWSHEET NOTE
[] [facility]  Outpatient Rehabilitation &  Therapy  [address]  [phone]  [phone] [] [facility]  Outpatient Rehabilitation &  Therapy  [address]  P: [phone]  F: [phone] [x] [facility]  Outpatient Rehabilitation &  Therapy  [address]  P: [phone]  F: [phone] [] [facility]  Outpatient Rehabilitation &  Therapy  [address]  [phone]  [phone] [] [facility]  Outpatient Rehabilitation &  Therapy  [address]   P: [phone]  F: [phone]  [] [facility]  Outpatient Rehabilitation &  Therapy  [address]  P: [phone]  F: [phone] [] [facility]  Outpatient Rehabilitation &  Therapy  [address]  P: [phone]  F: [phone] [] [facility]  Outpatient Rehabilitation &  Therapy  [address]  P: [phone]  F: [phone] [] [facility]  Outpatient Rehabilitation &  Therapy  [address]  P: [phone]  F: [phone]  [] [facility] Outpatient Rehabilitation &  Therapy  [address]  P: [phone]  F: [phone]     Physical Therapy Daily Treatment Note  Date:  2025  Patient Name:  Margaret Schoenlein    :  1955  MRN: [id number]  Physician: Kb Hernandez APRN - CNP                         Insurance: [employer] Medicare, auth after eval, $35 copay,  approved 10vs after eval&treat, totaling 11vs from -25 auth#[id number]. CPTs 97388,[id number] oop 4500/3611.09 rem.      Medical Diagnosis: Z47.1 (ICD-10-CM) - Aftercare following joint replacement surgery Z96.642 (ICD-10-CM) - Presence of left artificial hip joint                  Rehab Codes:M25.552, M25.652, R26.2   Onset date: 25               Next 's appt.: 25     Visit# /

## 2025-07-30 ENCOUNTER — HOSPITAL ENCOUNTER (OUTPATIENT)
Dept: PHYSICAL THERAPY | Facility: CLINIC | Age: 70
Setting detail: THERAPIES SERIES
Discharge: HOME | End: 2025-07-30
Payer: MEDICARE

## 2025-07-30 PROCEDURE — 97110 THERAPEUTIC EXERCISES: CPT
